# Patient Record
Sex: FEMALE | Race: WHITE | NOT HISPANIC OR LATINO | Employment: UNEMPLOYED | URBAN - METROPOLITAN AREA
[De-identification: names, ages, dates, MRNs, and addresses within clinical notes are randomized per-mention and may not be internally consistent; named-entity substitution may affect disease eponyms.]

---

## 2018-01-08 ENCOUNTER — GENERIC CONVERSION - ENCOUNTER (OUTPATIENT)
Dept: OTHER | Facility: OTHER | Age: 28
End: 2018-01-08

## 2018-01-12 ENCOUNTER — GENERIC CONVERSION - ENCOUNTER (OUTPATIENT)
Dept: OTHER | Facility: OTHER | Age: 28
End: 2018-01-12

## 2018-01-23 NOTE — MISCELLANEOUS
Message   Date: 08 Jan 2018 7:54 AM EST, Recorded By: Ragini Mccarthy For: Teagan Maurice: MADGA NINOICA, Self   Phone: [phone] (Home)   Reason: Renew Medication   Patient called for Nuva Ring refill  Escript sent  Advised schedule appointment  Active Problems    1  Birth control counseling (V25 09) (Z30 09)   2  Breast self examination education, encounter for (V65 49) (Z71 89)   3  Cervical cancer screening (V76 2) (Z12 4)   4  Encounter for routine gynecological examination with Papanicolaou smear of cervix   (V72 31,V76 2) (Z01 419)   5  Oral contraceptive prescribed (V25 01) (Z30 011)   6  Screening for HPV (human papillomavirus) (V73 81) (Z11 51)    Allergies    1   No Known Drug Allergies    Plan  Oral contraceptive prescribed    · NuvaRing 0 12-0 015 MG/24HR Vaginal Ring; INSERT 1 RING VAGINALLY FOR  3 WEEKS THEN 1 WEEK OFF    Signatures   Electronically signed by : Gildardo Partida, ; Jan 8 2018  7:57AM EST                       (Author)

## 2018-02-26 NOTE — MISCELLANEOUS
Message   Recorded as Task   Date: 01/05/2018 09:58 PM, Created By: Shay Prince   Task Name: Care Coordination   Assigned To: Bean Flynn   Regarding Patient: Shanae Salas, Status: In Progress   Comment:    Shay Prince - 05 Jan 2018 9:58 PM     TASK CREATED  pt called the answering service requesting a refill for NUVA RING  I advised pt to call office on Monday AM   DawnPierre - 08 Jan 2018 2:31 PM     TASK REPLIED TO: Previously Assigned To Bean Flynn                      Prescription signed, needs yearly exam soon   Sameera Sheridan - 09 Jan 2018 7:46 AM     TASK IN PROGRESS   Sameera Sheridan - 12 Jan 2018 7:24 AM     TASK EDITED  sent pt a letter reminding her to schedule her yearly exam  pt did not return messages           Active Problems    1  Birth control counseling (V25 09) (Z30 09)   2  Breast self examination education, encounter for (V65 49) (Z71 89)   3  Cervical cancer screening (V76 2) (Z12 4)   4  Encounter for routine gynecological examination with Papanicolaou smear of cervix   (V72 31,V76 2) (Z01 419)   5  Oral contraceptive prescribed (V25 01) (Z30 011)   6  Screening for HPV (human papillomavirus) (V73 81) (Z11 51)    Current Meds   1  NuvaRing 0 12-0 015 MG/24HR Vaginal Ring; INSERT 1 RING VAGINALLY FOR 3   WEEKS THEN 1 WEEK OFF; Therapy: [id number] to ([facility] [place])  Requested for: [id number]; Last   Rx:08Jan2018 Ordered    Allergies    1   No Known Drug Allergies    Signatures   Electronically signed by : Reanna Estrada, ; Jan 12 2018  7:24AM EST                       (Author)

## 2021-04-27 ENCOUNTER — TELEPHONE (OUTPATIENT)
Dept: INTERNAL MEDICINE CLINIC | Facility: CLINIC | Age: 31
End: 2021-04-27

## 2021-04-27 NOTE — TELEPHONE ENCOUNTER
I called patient and no voice mail and when she calls back please ask for her insurance information   Thank you

## 2021-04-30 ENCOUNTER — OFFICE VISIT (OUTPATIENT)
Dept: INTERNAL MEDICINE CLINIC | Facility: CLINIC | Age: 31
End: 2021-04-30
Payer: COMMERCIAL

## 2021-04-30 VITALS
SYSTOLIC BLOOD PRESSURE: 114 MMHG | BODY MASS INDEX: 20.18 KG/M2 | WEIGHT: 118.2 LBS | HEIGHT: 64 IN | HEART RATE: 82 BPM | TEMPERATURE: 96.7 F | OXYGEN SATURATION: 98 % | DIASTOLIC BLOOD PRESSURE: 64 MMHG

## 2021-04-30 DIAGNOSIS — D64.9 ANEMIA, UNSPECIFIED TYPE: Primary | ICD-10-CM

## 2021-04-30 DIAGNOSIS — R63.8 DIFFICULTY MAINTAINING WEIGHT: ICD-10-CM

## 2021-04-30 DIAGNOSIS — J30.2 SEASONAL ALLERGIES: ICD-10-CM

## 2021-04-30 DIAGNOSIS — J30.9 ALLERGIC SINUSITIS: ICD-10-CM

## 2021-04-30 DIAGNOSIS — I73.00 RAYNAUD'S DISEASE WITHOUT GANGRENE: ICD-10-CM

## 2021-04-30 DIAGNOSIS — J45.990 EXERCISE-INDUCED ASTHMA: ICD-10-CM

## 2021-04-30 PROBLEM — F90.2 ATTENTION DEFICIT HYPERACTIVITY DISORDER (ADHD), COMBINED TYPE: Status: ACTIVE | Noted: 2021-04-30

## 2021-04-30 PROBLEM — J30.1 SEASONAL ALLERGIC RHINITIS DUE TO POLLEN: Status: ACTIVE | Noted: 2021-04-30

## 2021-04-30 PROCEDURE — 3008F BODY MASS INDEX DOCD: CPT | Performed by: INTERNAL MEDICINE

## 2021-04-30 PROCEDURE — 1036F TOBACCO NON-USER: CPT | Performed by: INTERNAL MEDICINE

## 2021-04-30 PROCEDURE — 3725F SCREEN DEPRESSION PERFORMED: CPT | Performed by: INTERNAL MEDICINE

## 2021-04-30 PROCEDURE — 99203 OFFICE O/P NEW LOW 30 MIN: CPT | Performed by: INTERNAL MEDICINE

## 2021-04-30 RX ORDER — ALBUTEROL SULFATE 90 UG/1
2 AEROSOL, METERED RESPIRATORY (INHALATION) EVERY 6 HOURS PRN
Qty: 8.5 G | Refills: 1 | Status: SHIPPED | OUTPATIENT
Start: 2021-04-30 | End: 2021-09-22

## 2021-04-30 RX ORDER — ETONOGESTREL AND ETHINYL ESTRADIOL 11.7; 2.7 MG/1; MG/1
1 INSERT, EXTENDED RELEASE VAGINAL
COMMUNITY

## 2021-04-30 RX ORDER — DEXTROAMPHETAMINE SACCHARATE, AMPHETAMINE ASPARTATE MONOHYDRATE, DEXTROAMPHETAMINE SULFATE AND AMPHETAMINE SULFATE 5; 5; 5; 5 MG/1; MG/1; MG/1; MG/1
20 CAPSULE, EXTENDED RELEASE ORAL EVERY MORNING
COMMUNITY

## 2021-04-30 NOTE — PROGRESS NOTES
Assessment/Plan:    1  Anemia, unspecified type  CBC and differential    C-reactive protein    Comprehensive metabolic panel    Thyroid Antibodies Panel    T4, free    T3    Tissue Transglutaminase, IgG,IgA    Vitamin B12   2  Difficulty maintaining weight     3  Raynaud's disease without gangrene  GEORGE Screen w/ Reflex to Titer/Pattern    albuterol (PROVENTIL HFA,VENTOLIN HFA) 90 mcg/act inhaler   4  Exercise-induced asthma  [facility] Allergy Panel, Adult   5  Seasonal allergies  [facility] Allergy Panel, Adult   6  Allergic sinusitis              Subjective:      Patient ID: Wells Sandifer is a 35 y o  female who is here today to discuss several issues of a chronic nature and to establish care  Markie Maradiaga and her  live in [place] and soon will be moving to [facility]  They are visiting the area as both just go and her fiance are from this area and staying with ANGELINEdevondeanasavita 77 future in-laws  Just has a lifelong history of difficulty with maintaining weight despite a good appetite, with chronic low-grade dyspepsia after eating, sometimes some gassiness, no diarrhea but occasional constipation  Associated with his history is chronic anemia, with iron deficiency, including onset prior to menarche when she was about [age]  Generally she is prescribed iron, which worsened her constipation, and her anemia persists in iron levels never reach normal       Also, just good notes that if he exercises strenuously, she times feels weak and lightheaded  There is no chest pain, palpitations, syncope  This is more feeling of running out of energy  Also, there is a history of both seasonal allergic nasal congestion and perennial sinus allergic congestion, partially relieved by Clarinex, but always present and not responsive to other antihistamines  There is sinus pressure, itchy eyes, nasal congestion of clear mucus and postnasal drainage        In addition, over the last year to, she has developed wheezing with exercise, and mild dyspnea, no wheezing or dyspnea at rest   There is no previous history of asthma  Symptoms from [facility] worse when she returns home this area and her symptoms originally began when she and her high school in this area  There is a history of Raynaud's disease, and a family history of autoimmune thyroid disease  There is no family history of inflammatory bowel disease, no known history of celiac disease, pernicious anemia  There is no history of rash, no gyn problems, no urinary difficulties  There is no history of any joint inflammation  Regarding chronic iron deficiency anemia, difficulties maintaining weight and nonspecific dyspepsia, these may be related to malabsorptive syndrome including celiac disease, pernicious anemia, with pancreatic insufficiency very unlikely, motility disorder possibility and inflammatory bowel disease  Plan initially is to check appropriate lab work, and proceed with workup as indicated  Regarding allergies and recent onset of adult asthma, manifested as exercise-induced asthma, the plan will be allergy panel, continue Clarinex for now, start albuterol inhaler 2 relations 20 minutes before exercise  I will contact just go with her lab results and then assess response to albuterol  We discussed follow-up with a new primary care physician when she moved to [facility] with an allergist, as parties when she moves to [place] in July  The meantime I will stay involved with her care            Past Medical History:   Diagnosis Date    ADHD     Anemia         Family History   Problem Relation Age of Onset    No Known Problems Mother     Hypertension Father         Social History     Socioeconomic History    Marital status: Single     Spouse name: Not on file    Number of children: Not on file    Years of education: Not on file    Highest education level: Not on file Occupational History    Not on file   Social Needs    Financial resource strain: Not on file    Food insecurity     Worry: Not on file     Inability: Not on file    Transportation needs     Medical: Not on file     Non-medical: Not on file   Tobacco Use    Smoking status: Never Smoker    Smokeless tobacco: Never Used   Substance and Sexual Activity    Alcohol use: Yes     Frequency: Monthly or less     Drinks per session: 3 or 4     Binge frequency: Less than monthly     Comment: social    Drug use: Never    Sexual activity: Not on file   Lifestyle    Physical activity     Days per week: Not on file     Minutes per session: Not on file    Stress: Not on file   Relationships    Social connections     Talks on phone: Not on file     Gets together: Not on file     Attends Yazidi service: Not on file     Active member of club or organization: Not on file     Attends meetings of clubs or organizations: Not on file     Relationship status: Not on file    Intimate partner violence     Fear of current or ex partner: Not on file     Emotionally abused: Not on file     Physically abused: Not on file     Forced sexual activity: Not on file   Other Topics Concern    Not on file   Social History Narrative    Not on file        No Known Allergies     Current Outpatient Medications   Medication Sig Dispense Refill    amphetamine-dextroamphetamine (ADDERALL XR) 20 MG 24 hr capsule Take 20 mg by mouth every morning      etonogestrel-ethinyl estradiol (NUVARING) 0 12-0 015 MG/24HR vaginal ring Insert 1 each into the vagina every 28 days Insert vaginally and leave in place for 3 consecutive weeks, then remove for 1 week   albuterol (PROVENTIL HFA,VENTOLIN HFA) 90 mcg/act inhaler Inhale 2 puffs every 6 (six) hours as needed for wheezing or shortness of breath 8 5 g 1     No current facility-administered medications for this visit  Review of Systems  as above      Objective:      /64   Pulse 82 Temp (!) 96 7 °F (35 9 °C) (Tympanic)   Ht 5' 4" (1 626 m)   Wt 53 6 kg (118 lb 3 2 oz)   SpO2 98%   BMI 20 29 kg/m²      Wt Readings from Last 3 Encounters:   04/30/21 53 6 kg (118 lb 3 2 oz)   12/30/16 58 5 kg (129 lb)   12/30/15 61 2 kg (135 lb)     Temp Readings from Last 3 Encounters:   04/30/21 (!) 96 7 °F (35 9 °C) (Tympanic)     BP Readings from Last 3 Encounters:   04/30/21 114/64   12/30/16 118/64   12/30/15 120/70     Pulse Readings from Last 3 Encounters:   04/30/21 82       VSS, afebrile, pulse regular    Alert and Oriented in NAD    HEENT: NCAT, Pupils equal, 3 mm, EOEMI, no icterus  There is obvious conjunctival pallor, no iritis  There is mild allergic conjunctivitis  No head or neck mass or adenopathy  There is mild fullness over the facial sinuses  Neck: supple, no trauma or tenderness  No JVD  Trachea midline without stridor  Thyroid exam normal on inspection and palpation  No spinal tenderness, full range of motion  Lymphatics: no adenopathy throughout    Chest:  No trauma or deformity, no supraclavicular adenopathy or bruit  Chest wall expansion is symmetric and normal, expiratory phase is not prolonged  Heart:  Regular rate and rhythm, normal V0-R9, no J7-M5, no clicks murmurs or rubs  Lungs:  Clear to auscultation and normal to percussion  Back:  No trauma or deformity, no CVA mass or CVA tenderness  Skin:  No rash or skin malignancy  Abdomen:  Nondistended, normal bowel sounds, soft nontender without masses bruits organomegaly  There is no hernia  There are no surgical scars  Extremities:  Arterial circulation is symmetrically normal with no clubbing cyanosis or edema  There are no varicosities, there is no calf tenderness or phlebitic findings  Joints:  No deformity, swelling, redness, tenderness or increased temperature, no instability  There are no tophi      Affect:  Normal    Neurologic:  Normal and stable gait, and otherwise no focal findings as well  Cognitive: Intact

## 2021-05-02 ENCOUNTER — APPOINTMENT (OUTPATIENT)
Dept: LAB | Facility: HOSPITAL | Age: 31
End: 2021-05-02
Payer: COMMERCIAL

## 2021-05-02 DIAGNOSIS — J30.2 SEASONAL ALLERGIES: ICD-10-CM

## 2021-05-02 DIAGNOSIS — J45.990 EXERCISE-INDUCED ASTHMA: ICD-10-CM

## 2021-05-02 LAB
ALBUMIN SERPL BCP-MCNC: 3.7 G/DL (ref 3.5–5)
ALP SERPL-CCNC: 57 U/L (ref 46–116)
ALT SERPL W P-5'-P-CCNC: 31 U/L (ref 12–78)
ANION GAP SERPL CALCULATED.3IONS-SCNC: 10 MMOL/L (ref 4–13)
ANISOCYTOSIS BLD QL SMEAR: PRESENT
AST SERPL W P-5'-P-CCNC: 24 U/L (ref 5–45)
BASOPHILS # BLD MANUAL: 0.05 THOUSAND/UL (ref 0–0.1)
BASOPHILS NFR MAR MANUAL: 2 % (ref 0–1)
BILIRUB SERPL-MCNC: 0.15 MG/DL (ref 0.2–1)
BUN SERPL-MCNC: 8 MG/DL (ref 5–25)
CALCIUM SERPL-MCNC: 9 MG/DL (ref 8.3–10.1)
CHLORIDE SERPL-SCNC: 104 MMOL/L (ref 100–108)
CO2 SERPL-SCNC: 26 MMOL/L (ref 21–32)
CREAT SERPL-MCNC: 0.74 MG/DL (ref 0.6–1.3)
CRP SERPL QL: 22 MG/L
EOSINOPHIL # BLD MANUAL: 0.05 THOUSAND/UL (ref 0–0.4)
EOSINOPHIL NFR BLD MANUAL: 2 % (ref 0–6)
ERYTHROCYTE [DISTWIDTH] IN BLOOD BY AUTOMATED COUNT: 15.3 % (ref 11.6–15.1)
GFR SERPL CREATININE-BSD FRML MDRD: 110 ML/MIN/1.73SQ M
GLUCOSE P FAST SERPL-MCNC: 89 MG/DL (ref 65–99)
HCT VFR BLD AUTO: 30.7 % (ref 34.8–46.1)
HGB BLD-MCNC: 8.1 G/DL (ref 11.5–15.4)
HYPERCHROMIA BLD QL SMEAR: PRESENT
LG PLATELETS BLD QL SMEAR: PRESENT
LYMPHOCYTES # BLD AUTO: 1.08 THOUSAND/UL (ref 0.6–4.47)
LYMPHOCYTES # BLD AUTO: 42 % (ref 14–44)
MCH RBC QN AUTO: 18.5 PG (ref 26.8–34.3)
MCHC RBC AUTO-ENTMCNC: 26.4 G/DL (ref 31.4–37.4)
MCV RBC AUTO: 70 FL (ref 82–98)
MONOCYTES # BLD AUTO: 0.39 THOUSAND/UL (ref 0–1.22)
MONOCYTES NFR BLD: 15 % (ref 4–12)
NEUTROPHILS # BLD MANUAL: 0.93 THOUSAND/UL (ref 1.85–7.62)
NEUTS SEG NFR BLD AUTO: 36 % (ref 43–75)
NRBC BLD AUTO-RTO: 0 /100 WBCS
OVALOCYTES BLD QL SMEAR: PRESENT
PLATELET # BLD AUTO: 388 THOUSANDS/UL (ref 149–390)
PLATELET BLD QL SMEAR: ADEQUATE
PMV BLD AUTO: 8.8 FL (ref 8.9–12.7)
POTASSIUM SERPL-SCNC: 3.7 MMOL/L (ref 3.5–5.3)
PROT SERPL-MCNC: 7.3 G/DL (ref 6.4–8.2)
RBC # BLD AUTO: 4.38 MILLION/UL (ref 3.81–5.12)
SODIUM SERPL-SCNC: 140 MMOL/L (ref 136–145)
T3 SERPL-MCNC: 1.2 NG/ML (ref 0.6–1.8)
T4 FREE SERPL-MCNC: 1.03 NG/DL (ref 0.76–1.46)
TARGETS BLD QL SMEAR: PRESENT
TOTAL CELLS COUNTED SPEC: 100
VARIANT LYMPHS # BLD AUTO: 3 %
VIT B12 SERPL-MCNC: 227 PG/ML (ref 100–900)
WBC # BLD AUTO: 2.57 THOUSAND/UL (ref 4.31–10.16)

## 2021-05-02 PROCEDURE — 86039 ANTINUCLEAR ANTIBODIES (ANA): CPT | Performed by: INTERNAL MEDICINE

## 2021-05-02 PROCEDURE — 83516 IMMUNOASSAY NONANTIBODY: CPT | Performed by: INTERNAL MEDICINE

## 2021-05-02 PROCEDURE — 82785 ASSAY OF IGE: CPT

## 2021-05-02 PROCEDURE — 82607 VITAMIN B-12: CPT | Performed by: INTERNAL MEDICINE

## 2021-05-02 PROCEDURE — 36415 COLL VENOUS BLD VENIPUNCTURE: CPT | Performed by: INTERNAL MEDICINE

## 2021-05-02 PROCEDURE — 86038 ANTINUCLEAR ANTIBODIES: CPT | Performed by: INTERNAL MEDICINE

## 2021-05-02 PROCEDURE — 84480 ASSAY TRIIODOTHYRONINE (T3): CPT | Performed by: INTERNAL MEDICINE

## 2021-05-02 PROCEDURE — 86003 ALLG SPEC IGE CRUDE XTRC EA: CPT

## 2021-05-02 PROCEDURE — 86140 C-REACTIVE PROTEIN: CPT | Performed by: INTERNAL MEDICINE

## 2021-05-02 PROCEDURE — 84439 ASSAY OF FREE THYROXINE: CPT | Performed by: INTERNAL MEDICINE

## 2021-05-02 PROCEDURE — 85027 COMPLETE CBC AUTOMATED: CPT | Performed by: INTERNAL MEDICINE

## 2021-05-02 PROCEDURE — 80053 COMPREHEN METABOLIC PANEL: CPT | Performed by: INTERNAL MEDICINE

## 2021-05-02 PROCEDURE — 85007 BL SMEAR W/DIFF WBC COUNT: CPT | Performed by: INTERNAL MEDICINE

## 2021-05-03 DIAGNOSIS — D50.9 MICROCYTIC ANEMIA: Primary | ICD-10-CM

## 2021-05-03 DIAGNOSIS — E53.8 B12 DEFICIENCY: Primary | ICD-10-CM

## 2021-05-03 DIAGNOSIS — R53.83 FATIGUE, UNSPECIFIED TYPE: ICD-10-CM

## 2021-05-03 LAB
A ALTERNATA IGE QN: <0.1 KUA/I
A FUMIGATUS IGE QN: <0.1 KUA/I
ALLERGEN COMMENT: ABNORMAL
ANA HOMOGEN SER QL IF: NORMAL
ANA HOMOGEN TITR SER: NORMAL {TITER}
BERMUDA GRASS IGE QN: <0.1 KUA/I
BOXELDER IGE QN: <0.1 KUA/I
C HERBARUM IGE QN: <0.1 KUA/I
CAT DANDER IGE QN: <0.1 KUA/I
CMN PIGWEED IGE QN: <0.1 KUA/I
COMMON RAGWEED IGE QN: <0.1 KUA/I
COTTONWOOD IGE QN: <0.1 KUA/I
D FARINAE IGE QN: <0.1 KUA/I
D PTERONYSS IGE QN: <0.1 KUA/I
DOG DANDER IGE QN: <0.1 KUA/I
LONDON PLANE IGE QN: <0.1 KUA/I
MOUSE URINE PROT IGE QN: <0.1 KUA/I
MT JUNIPER IGE QN: <0.1 KUA/I
MUGWORT IGE QN: <0.1 KUA/I
P NOTATUM IGE QN: <0.1 KUA/I
ROACH IGE QN: <0.1 KUA/I
RYE IGE QN: POSITIVE
SHEEP SORREL IGE QN: <0.1 KUA/I
SILVER BIRCH IGE QN: <0.1 KUA/I
TIMOTHY IGE QN: <0.1 KUA/I
TOTAL IGE SMQN RAST: 20.6 KU/L (ref 0–113)
TTG IGA SER-ACNC: <2 U/ML (ref 0–3)
TTG IGG SER-ACNC: <2 U/ML (ref 0–5)
WALNUT IGE QN: <0.1 KUA/I
WHITE ASH IGE QN: 0.13 KUA/I
WHITE ELM IGE QN: <0.1 KUA/I
WHITE MULBERRY IGE QN: <0.1 KUA/I
WHITE OAK IGE QN: <0.1 KUA/I

## 2021-05-04 ENCOUNTER — TELEPHONE (OUTPATIENT)
Dept: INTERNAL MEDICINE CLINIC | Facility: CLINIC | Age: 31
End: 2021-05-04

## 2021-05-04 DIAGNOSIS — J30.1 SEASONAL ALLERGIC RHINITIS DUE TO POLLEN: Primary | ICD-10-CM

## 2021-05-04 RX ORDER — AZELASTINE 1 MG/ML
1 SPRAY, METERED NASAL 2 TIMES DAILY
Qty: 1 BOTTLE | Refills: 1 | Status: SHIPPED | OUTPATIENT
Start: 2021-05-04 | End: 2021-05-28

## 2021-05-04 NOTE — PROGRESS NOTES
I spoke to Bigg Rodas and reviewed the results  In particular, we discussed that she has severe chronic anemia, with evidence of iron deficiency and B12 deficiency which would indicate chronic malabsorption of these nutrients  I asked Bigg Rodas to have follow-up lab work, including TSH, iron, ferritin and methylmalonic acid levels drawn tomorrow morning  We discussed the likelihood of starting Venofer infusion and B12 injections  We discussed that her low energy level should improve significantly with treatment  We discussed the negative TTG antibody test, elevated CRP a mildly positive GEORGE  I think the GEORGE is a collateral of chronic inflammation and probably increased cell turnover, and that the CRP indicates a chronic inflammatory condition which could be related to celiac disease even know antibody testing is negative  We discussed an alternative diagnosis of possible inflammatory bowel disease, including Crohn's disease  We discussed the need for GI follow-up once the rest of the testing is back, and I will consult Gastroenterology, and order a CT of the abdomen and pelvis with GI imaging at the time of GI consult  We reviewed her [facility] Allergy Panel result which showed sensitivity to white curtis trees but no other sensitivities  Her symptoms tend to be in the spring and early summer  Plan is a trial of Astelin nasal spray 1 spray in each nostril twice daily, and then gauge response

## 2021-05-04 NOTE — PROGRESS NOTES
Yesterday morning, I attempted to contact just go and left a voicemail on her phone to return my call  I just left a 2nd voicemail asking the just got return my call as well  Just because labs show severe chronic anemia, mild leukopenia, with hemoglobin of 8 1, hematocrit 30 7, white count 2 57, low MCV of 70, abnormal differential white count including decreased neutrophils, slight increased monocytes, increased basophils and atypical lymphocytes  B12 level is low at 227  Methylmalonic acid level was ordered and is pending as just goal will need to return to the lab  Iron studies were ordered and are pending and just go will need to return to lab for these  CRP is elevated 22 0  GEORGE is positive  TTG antibody test is negative for celiac disease  T4 and T3 are normal with TSH pending  Thyroid antibody screen is pending  Plan:  Continue diagnostic workup as this appears to be negative for celiac disease  Possibilities would be inflammatory bowel disease including Crohn's disease causing chronic malabsorption  Other possibilities could be psychogenic cause  This would be a diagnosis of exclusion  Will discuss CT of the abdomen and pelvis with oral contrast, and follow-up with Gastroenterology  Also, given severity of anemia and probable severe iron deficiency, iron infusion therapy will be recommended as well starting intramuscular vitamin B12  I will continue to attempt to reach Jaz Alfaro by phone  Paty Grijalva

## 2021-05-05 ENCOUNTER — APPOINTMENT (OUTPATIENT)
Dept: LAB | Facility: MEDICAL CENTER | Age: 31
End: 2021-05-05
Payer: COMMERCIAL

## 2021-05-05 DIAGNOSIS — E53.8 B12 DEFICIENCY: ICD-10-CM

## 2021-05-05 DIAGNOSIS — D50.9 MICROCYTIC ANEMIA: ICD-10-CM

## 2021-05-05 LAB
FERRITIN SERPL-MCNC: 3 NG/ML (ref 8–388)
IRON SATN MFR SERPL: 3 %
IRON SERPL-MCNC: 18 UG/DL (ref 50–170)
TIBC SERPL-MCNC: 616 UG/DL (ref 250–450)
TSH SERPL DL<=0.05 MIU/L-ACNC: 1.64 UIU/ML (ref 0.36–3.74)

## 2021-05-05 PROCEDURE — 82728 ASSAY OF FERRITIN: CPT | Performed by: INTERNAL MEDICINE

## 2021-05-05 PROCEDURE — 83918 ORGANIC ACIDS TOTAL QUANT: CPT

## 2021-05-05 PROCEDURE — 86376 MICROSOMAL ANTIBODY EACH: CPT | Performed by: INTERNAL MEDICINE

## 2021-05-05 PROCEDURE — 36415 COLL VENOUS BLD VENIPUNCTURE: CPT | Performed by: INTERNAL MEDICINE

## 2021-05-05 PROCEDURE — 86800 THYROGLOBULIN ANTIBODY: CPT | Performed by: INTERNAL MEDICINE

## 2021-05-05 PROCEDURE — 84443 ASSAY THYROID STIM HORMONE: CPT | Performed by: INTERNAL MEDICINE

## 2021-05-05 PROCEDURE — 83550 IRON BINDING TEST: CPT

## 2021-05-05 PROCEDURE — 83540 ASSAY OF IRON: CPT

## 2021-05-06 ENCOUNTER — TELEPHONE (OUTPATIENT)
Dept: INTERNAL MEDICINE CLINIC | Facility: CLINIC | Age: 31
End: 2021-05-06

## 2021-05-06 DIAGNOSIS — D50.8 OTHER IRON DEFICIENCY ANEMIA: Primary | ICD-10-CM

## 2021-05-06 DIAGNOSIS — K90.9 IRON MALABSORPTION: ICD-10-CM

## 2021-05-06 DIAGNOSIS — E53.8 B12 DEFICIENCY: ICD-10-CM

## 2021-05-06 LAB
THYROGLOB AB SERPL-ACNC: <1 IU/ML (ref 0–0.9)
THYROPEROXIDASE AB SERPL-ACNC: <9 IU/ML (ref 0–34)

## 2021-05-06 RX ORDER — SODIUM CHLORIDE 9 MG/ML
20 INJECTION, SOLUTION INTRAVENOUS ONCE
Status: CANCELLED | OUTPATIENT
Start: 2021-05-13

## 2021-05-06 NOTE — PROGRESS NOTES
Addendum to chart:  The latest test results in follow-up to recent testing showing microcytic anemia with MCV of 70, hemoglobin 8 1, hematocrit 30 7, included an iron level which was low at 18, of low ferritin level 3, elevated TIBC of 616 and iron saturation of 3  This is consistent with severe iron deficiency anemia in the context of intestinal malabsorption of unknown cause  Also, B12 level is low at 227 with methylmalonic acid level pending  TTG IgA IgM antibodies are negative  Plan:  Continue workup for intestinal malabsorption induced chronic iron deficiency anemia and B12 deficiency  This will include a CT of the abdomen and pelvis with oral contrast, and referral to [address] [place] [address] gastroenterology  Treatment will include iron infusion with Venofer and the infusion protocol was completed  Also, will start B12 injections, 1000 mcg I am this week, then in 2 weeks, then in 4 weeks, and then monthly thereafter on indefinite basis  I did leave a message for just ago so that I can as outlined they plan to her  We will help arrange for iron infusion scheduling at the [facility] and B12 injections at our office  Will recheck CBC and iron levels following iron infusion  Despite negative antibody testing, celiac disease would still be a possibility  An alternative would be upper intestinal involvement from inflammatory bowel disease such as Crohn's disease  Nutritional deficiency is a possibility

## 2021-05-07 ENCOUNTER — TELEPHONE (OUTPATIENT)
Dept: INTERNAL MEDICINE CLINIC | Facility: CLINIC | Age: 31
End: 2021-05-07

## 2021-05-07 NOTE — TELEPHONE ENCOUNTER
Left voicemail for pt to call office to schedule B-12 injection and get availability to schedule iron infusion and CT abdomen/pevlis w/contrast  Will try again this afternoon

## 2021-05-07 NOTE — TELEPHONE ENCOUNTER
Scheduled pt for her CT scan on 5/14/21 at [facility]  Pt will be picking up the barium prior to test date  Gave pt detailed instructions for CT prep  Scheduled pt for B-12 shot in office  Scheduled pt for iron infusion on 5/13/21, 5/22/21 and 5/29/21 at [facility]  Pt informed on all dates, times and locations

## 2021-05-10 DIAGNOSIS — K90.9 IRON MALABSORPTION: Primary | ICD-10-CM

## 2021-05-10 DIAGNOSIS — D50.8 OTHER IRON DEFICIENCY ANEMIA: ICD-10-CM

## 2021-05-11 ENCOUNTER — CLINICAL SUPPORT (OUTPATIENT)
Dept: INTERNAL MEDICINE CLINIC | Facility: CLINIC | Age: 31
End: 2021-05-11

## 2021-05-11 DIAGNOSIS — E53.8 B12 DEFICIENCY: Primary | ICD-10-CM

## 2021-05-12 LAB
METHYLMALONATE SERPL-SCNC: 146 NMOL/L (ref 0–378)
SL AMB DISCLAIMER: NORMAL

## 2021-05-13 ENCOUNTER — HOSPITAL ENCOUNTER (OUTPATIENT)
Dept: INFUSION CENTER | Facility: CLINIC | Age: 31
Discharge: HOME | End: 2021-05-13
Payer: COMMERCIAL

## 2021-05-13 VITALS
DIASTOLIC BLOOD PRESSURE: 68 MMHG | TEMPERATURE: 97.7 F | OXYGEN SATURATION: 98 % | HEART RATE: 110 BPM | SYSTOLIC BLOOD PRESSURE: 112 MMHG | RESPIRATION RATE: 14 BRPM

## 2021-05-13 DIAGNOSIS — D50.8 OTHER IRON DEFICIENCY ANEMIA: ICD-10-CM

## 2021-05-13 DIAGNOSIS — K90.9 IRON MALABSORPTION: Primary | ICD-10-CM

## 2021-05-13 PROCEDURE — 96365 THER/PROPH/DIAG IV INF INIT: CPT

## 2021-05-13 RX ORDER — SODIUM CHLORIDE 9 MG/ML
20 INJECTION, SOLUTION INTRAVENOUS ONCE
Status: CANCELLED | OUTPATIENT
Start: 2021-05-20

## 2021-05-13 RX ORDER — SODIUM CHLORIDE 9 MG/ML
20 INJECTION, SOLUTION INTRAVENOUS ONCE
Status: COMPLETED | OUTPATIENT
Start: 2021-05-13 | End: 2021-05-13

## 2021-05-13 RX ADMIN — SODIUM CHLORIDE 20 ML/HR: 0.9 INJECTION, SOLUTION INTRAVENOUS at 10:52

## 2021-05-13 RX ADMIN — IRON SUCROSE 300 MG: 20 INJECTION, SOLUTION INTRAVENOUS at 10:57

## 2021-05-14 ENCOUNTER — TELEPHONE (OUTPATIENT)
Dept: INTERNAL MEDICINE CLINIC | Facility: CLINIC | Age: 31
End: 2021-05-14

## 2021-05-14 NOTE — TELEPHONE ENCOUNTER
Pts CT was denied by her insurance company,so she cancelled the test I told her we would get back to her with further instructions with how you would like to proceed  Karena More can be reached at [phone]

## 2021-05-17 ENCOUNTER — TELEPHONE (OUTPATIENT)
Dept: INTERNAL MEDICINE CLINIC | Facility: CLINIC | Age: 31
End: 2021-05-17

## 2021-05-17 DIAGNOSIS — D50.8 OTHER IRON DEFICIENCY ANEMIA: Primary | ICD-10-CM

## 2021-05-17 DIAGNOSIS — E53.8 B12 DEFICIENCY: ICD-10-CM

## 2021-05-17 DIAGNOSIS — K90.9 IRON MALABSORPTION: ICD-10-CM

## 2021-05-17 NOTE — PROGRESS NOTES
I spoke to just go who be undergoing Venofer iron infusion #2 on May 22nd and B12 injection #2 on May 24  Nandini's insurance denied the CT of the abdomen despite this diagnostic test being medically necessary  We discussed that her chronic severe iron deficiency, and what seems to be relatively recent B12 deficiency, both of which are related to have chronic malabsorption, requires a diagnostic workup  As her initial labs did not show evidence of celiac disease, diagnostic workup needs to include a CT of the abdomen and pelvis to look for inflammatory bowel disease, anatomic evidence for bacterial overgrowth, and also a diagnostic workup is needed through Gastroenterology including probable endoscopy and diagnostic testing  For this reason, [facility]'s gastroenterology asking for a consult in the near future, regarding chronic iron malabsorption and recent B12 malabsorption  I will contact just go with for follow-up lab work ordered today, including a repeat CBC, iron studies and B12 levels, and remain available as the diagnostic process transpires  Adjusted continues to have exercise intolerance, getting out of breath and fatigue rather easily and the occasional chronic migraine she has may also be related to anemia as well we discussed this

## 2021-05-18 ENCOUNTER — LAB (OUTPATIENT)
Dept: LAB | Facility: HOSPITAL | Age: 31
End: 2021-05-18
Payer: COMMERCIAL

## 2021-05-18 LAB
BASOPHILS # BLD AUTO: 0.01 THOUSANDS/ΜL (ref 0–0.1)
BASOPHILS NFR BLD AUTO: 0 % (ref 0–1)
EOSINOPHIL # BLD AUTO: 0.08 THOUSAND/ΜL (ref 0–0.61)
EOSINOPHIL NFR BLD AUTO: 2 % (ref 0–6)
ERYTHROCYTE [DISTWIDTH] IN BLOOD BY AUTOMATED COUNT: 19.1 % (ref 11.6–15.1)
FERRITIN SERPL-MCNC: 65 NG/ML (ref 8–388)
HCT VFR BLD AUTO: 31.9 % (ref 34.8–46.1)
HGB BLD-MCNC: 8.5 G/DL (ref 11.5–15.4)
IMM GRANULOCYTES # BLD AUTO: 0 THOUSAND/UL (ref 0–0.2)
IMM GRANULOCYTES NFR BLD AUTO: 0 % (ref 0–2)
IRON SERPL-MCNC: 20 UG/DL (ref 50–170)
LYMPHOCYTES # BLD AUTO: 2 THOUSANDS/ΜL (ref 0.6–4.47)
LYMPHOCYTES NFR BLD AUTO: 47 % (ref 14–44)
MCH RBC QN AUTO: 19.1 PG (ref 26.8–34.3)
MCHC RBC AUTO-ENTMCNC: 26.6 G/DL (ref 31.4–37.4)
MCV RBC AUTO: 72 FL (ref 82–98)
MONOCYTES # BLD AUTO: 0.38 THOUSAND/ΜL (ref 0.17–1.22)
MONOCYTES NFR BLD AUTO: 9 % (ref 4–12)
NEUTROPHILS # BLD AUTO: 1.8 THOUSANDS/ΜL (ref 1.85–7.62)
NEUTS SEG NFR BLD AUTO: 42 % (ref 43–75)
NRBC BLD AUTO-RTO: 0 /100 WBCS
PLATELET # BLD AUTO: 473 THOUSANDS/UL (ref 149–390)
PMV BLD AUTO: 8.4 FL (ref 8.9–12.7)
RBC # BLD AUTO: 4.44 MILLION/UL (ref 3.81–5.12)
VIT B12 SERPL-MCNC: 419 PG/ML (ref 100–900)
WBC # BLD AUTO: 4.27 THOUSAND/UL (ref 4.31–10.16)

## 2021-05-18 PROCEDURE — 36415 COLL VENOUS BLD VENIPUNCTURE: CPT | Performed by: INTERNAL MEDICINE

## 2021-05-18 PROCEDURE — 83540 ASSAY OF IRON: CPT | Performed by: INTERNAL MEDICINE

## 2021-05-18 PROCEDURE — 85025 COMPLETE CBC W/AUTO DIFF WBC: CPT | Performed by: INTERNAL MEDICINE

## 2021-05-18 PROCEDURE — 82607 VITAMIN B-12: CPT | Performed by: INTERNAL MEDICINE

## 2021-05-18 PROCEDURE — 82728 ASSAY OF FERRITIN: CPT | Performed by: INTERNAL MEDICINE

## 2021-05-22 ENCOUNTER — HOSPITAL ENCOUNTER (OUTPATIENT)
Dept: INFUSION CENTER | Facility: CLINIC | Age: 31
Discharge: HOME | End: 2021-05-22
Payer: COMMERCIAL

## 2021-05-22 VITALS
RESPIRATION RATE: 18 BRPM | SYSTOLIC BLOOD PRESSURE: 106 MMHG | TEMPERATURE: 98.4 F | DIASTOLIC BLOOD PRESSURE: 72 MMHG | HEART RATE: 100 BPM

## 2021-05-22 DIAGNOSIS — D50.8 OTHER IRON DEFICIENCY ANEMIA: ICD-10-CM

## 2021-05-22 DIAGNOSIS — K90.9 IRON MALABSORPTION: Primary | ICD-10-CM

## 2021-05-22 PROCEDURE — 96365 THER/PROPH/DIAG IV INF INIT: CPT

## 2021-05-22 PROCEDURE — 96366 THER/PROPH/DIAG IV INF ADDON: CPT

## 2021-05-22 RX ORDER — SODIUM CHLORIDE 9 MG/ML
20 INJECTION, SOLUTION INTRAVENOUS ONCE
Status: COMPLETED | OUTPATIENT
Start: 2021-05-22 | End: 2021-05-22

## 2021-05-22 RX ORDER — SODIUM CHLORIDE 9 MG/ML
20 INJECTION, SOLUTION INTRAVENOUS ONCE
Status: CANCELLED | OUTPATIENT
Start: 2021-05-29

## 2021-05-22 RX ADMIN — SODIUM CHLORIDE 20 ML/HR: 0.9 INJECTION, SOLUTION INTRAVENOUS at 13:40

## 2021-05-22 RX ADMIN — IRON SUCROSE 300 MG: 20 INJECTION, SOLUTION INTRAVENOUS at 13:42

## 2021-05-22 NOTE — PLAN OF CARE

## 2021-05-22 NOTE — PLAN OF CARE
Problem: SAFETY ADULT  Goal: Patient will remain free of falls  Description: INTERVENTIONS:  - Assess patient frequently for physical needs  -  Identify cognitive and physical deficits and behaviors that affect risk of falls    -  [place] fall precautions as indicated by assessment   - Educate patient/family on patient safety including physical limitations  - Instruct patient to call for assistance with activity based on assessment  - Modify environment to reduce risk of injury  - Consider OT/PT consult to assist with strengthening/mobility  5/22/2021 1530 by Gale Franco RN  Outcome: Progressing  5/22/2021 1530 by Gale Franco RN  Outcome: Progressing  Goal: Maintain or return to baseline ADL function  Description: INTERVENTIONS:  -  Assess patient's ability to carry out ADLs; assess patient's baseline for ADL function and identify physical deficits which impact ability to perform ADLs (bathing, care of mouth/teeth, toileting, grooming, dressing, etc )  - Assess/evaluate cause of self-care deficits   - Assess range of motion  - Assess patient's mobility; develop plan if impaired  - Assess patient's need for assistive devices and provide as appropriate  - Encourage maximum independence but intervene and supervise when necessary  - Involve family in performance of ADLs  - Assess for home care needs following discharge   - Consider OT consult to assist with ADL evaluation and planning for discharge  - Provide patient education as appropriate  5/22/2021 1530 by Gale Franco RN  Outcome: Progressing  5/22/2021 1530 by Gale Franco RN  Outcome: Progressing  Goal: Maintain or return mobility status to optimal level  Description: INTERVENTIONS:  - Assess patient's baseline mobility status (ambulation, transfers, stairs, etc )    - Identify cognitive and physical deficits and behaviors that affect mobility  - Identify mobility aids required to assist with transfers and/or ambulation (gait belt, sit-to-stand, lift, walker, cane, etc )  - [place] fall precautions as indicated by assessment  - Record patient progress and toleration of activity level on Mobility SBAR; progress patient to next Phase/Stage  - Instruct patient to call for assistance with activity based on assessment  - Consider rehabilitation consult to assist with strengthening/weightbearing, etc   5/22/2021 1530 by José Miguel Bryan, RN  Outcome: Progressing  5/22/2021 1530 by José Miguel Bryan, RN  Outcome: Progressing

## 2021-05-22 NOTE — PROGRESS NOTES
Pt arrived for venofer infusion without complaints    she feels well   VSS, she reported some initial anxiety-- but otherwise ok     pt completed Venofer as ordered and was dcd stable and ambulatory with AVS    end re check on vs- stable    Confirmed appt next Saturday 11 am 5/29/2021

## 2021-05-25 ENCOUNTER — TELEPHONE (OUTPATIENT)
Dept: INTERNAL MEDICINE CLINIC | Facility: CLINIC | Age: 31
End: 2021-05-25

## 2021-05-25 ENCOUNTER — CLINICAL SUPPORT (OUTPATIENT)
Dept: INTERNAL MEDICINE CLINIC | Facility: CLINIC | Age: 31
End: 2021-05-25

## 2021-05-25 DIAGNOSIS — D50.8 OTHER IRON DEFICIENCY ANEMIA: ICD-10-CM

## 2021-05-25 DIAGNOSIS — E53.8 B12 DEFICIENCY: Primary | ICD-10-CM

## 2021-05-25 DIAGNOSIS — K90.9 IRON MALABSORPTION: Primary | ICD-10-CM

## 2021-05-25 DIAGNOSIS — E53.8 B12 DEFICIENCY: ICD-10-CM

## 2021-05-25 NOTE — TELEPHONE ENCOUNTER
Pt had 2 B12 injections so far and with her infusions she wasn't sure if you need her to schedule more B12 injections? I will call her back to inform her how you want her to proceed

## 2021-05-25 NOTE — PROGRESS NOTES
Pt came into the office today and was given a vit B12 injection Rt deltoid without any difficulty or se     NDC# 9182-9965-48 exp  8/22 lot# [id number]

## 2021-05-28 DIAGNOSIS — J30.1 SEASONAL ALLERGIC RHINITIS DUE TO POLLEN: ICD-10-CM

## 2021-05-28 RX ORDER — AZELASTINE 1 MG/ML
SPRAY, METERED NASAL
Qty: 1 ML | Refills: 1 | Status: SHIPPED | OUTPATIENT
Start: 2021-05-28 | End: 2021-06-16

## 2021-05-29 ENCOUNTER — HOSPITAL ENCOUNTER (OUTPATIENT)
Dept: INFUSION CENTER | Facility: CLINIC | Age: 31
Discharge: HOME | End: 2021-05-29
Payer: COMMERCIAL

## 2021-05-29 VITALS
TEMPERATURE: 97.8 F | DIASTOLIC BLOOD PRESSURE: 78 MMHG | RESPIRATION RATE: 14 BRPM | OXYGEN SATURATION: 99 % | HEART RATE: 94 BPM | SYSTOLIC BLOOD PRESSURE: 114 MMHG

## 2021-05-29 DIAGNOSIS — K90.9 IRON MALABSORPTION: Primary | ICD-10-CM

## 2021-05-29 DIAGNOSIS — D50.8 OTHER IRON DEFICIENCY ANEMIA: ICD-10-CM

## 2021-05-29 PROCEDURE — 96365 THER/PROPH/DIAG IV INF INIT: CPT

## 2021-05-29 RX ORDER — SODIUM CHLORIDE 9 MG/ML
20 INJECTION, SOLUTION INTRAVENOUS ONCE
Status: CANCELLED | OUTPATIENT
Start: 2021-06-04

## 2021-05-29 RX ORDER — SODIUM CHLORIDE 9 MG/ML
20 INJECTION, SOLUTION INTRAVENOUS ONCE
Status: COMPLETED | OUTPATIENT
Start: 2021-05-29 | End: 2021-05-29

## 2021-05-29 RX ADMIN — SODIUM CHLORIDE 20 ML/HR: 0.9 INJECTION, SOLUTION INTRAVENOUS at 11:26

## 2021-05-29 RX ADMIN — IRON SUCROSE 300 MG: 20 INJECTION, SOLUTION INTRAVENOUS at 11:26

## 2021-05-29 NOTE — PLAN OF CARE
Problem: Potential for Falls  Goal: Patient will remain free of falls  Description: INTERVENTIONS:  - Assess patient frequently for physical needs  -  Identify cognitive and physical deficits and behaviors that affect risk of falls  -  [place] fall precautions as indicated by assessment   - Educate patient/family on patient safety including physical limitations  - Instruct patient to call for assistance with activity based on assessment  - Modify environment to reduce risk of injury  - Consider OT/PT consult to assist with strengthening/mobility  Outcome: Progressing     Problem: Knowledge Deficit  Goal: Patient/family/caregiver demonstrates understanding of disease process, treatment plan, medications, and discharge instructions  Description: Complete learning assessment and assess knowledge base    Interventions:  - Provide teaching at level of understanding  - Provide teaching via preferred learning methods  Outcome: Progressing Normal Normal Normal Normal

## 2021-06-04 ENCOUNTER — TELEPHONE (OUTPATIENT)
Dept: INFUSION CENTER | Facility: CLINIC | Age: 31
End: 2021-06-04

## 2021-06-04 NOTE — TELEPHONE ENCOUNTER
I reached out to Aidan after she missed her 8am appnt  I left her VM notifying her of her next appnt on 6/11 and requested a return call to add the missed Tx onto the end of her Tx schedule

## 2021-06-09 NOTE — PROGRESS NOTES
Addendum to chart:  I have contacted Meghana Bailey and left messages several times and have not received a call back  I received a message from infusion center that just ago had missed her last infusion appointment which was rescheduled for 2 days from now, to 11/20/2021  I asked our staff to contact just ago regarding coming in next week for follow-up appointment  They did leave a message and I was told by Fabian Nichole that she was unable to speak to adjust the when she called  She did leave a voicemail message which I have done several times  Given her severe iron deficiency, low B12 levels and negative testing for celiac disease, autoimmune gastritis due to H pylori needs to be ruled out  This disorder can cause both vitamin B12 malabsorption and iron malabsorption and could lead to the production of intrinsic factor blocking antibodies and anti parietal antibodies  Meghana Bailey also did not respond to our phone calls for scheduling her 3rd B12 injection  This could be given at time of a follow-up visit next week  Also, repeat CBC, iron studies, B12 and methylmalonic acid are due  Meghana Bailey was also referred to Gastroenterology and there is no record of an appointment  We will continue to attempt to contact just go to assist with treatment and diagnostic follow-up

## 2021-06-11 ENCOUNTER — HOSPITAL ENCOUNTER (OUTPATIENT)
Dept: INFUSION CENTER | Facility: CLINIC | Age: 31
Discharge: HOME | End: 2021-06-11
Payer: COMMERCIAL

## 2021-06-11 VITALS
TEMPERATURE: 97.9 F | DIASTOLIC BLOOD PRESSURE: 85 MMHG | HEART RATE: 96 BPM | OXYGEN SATURATION: 97 % | SYSTOLIC BLOOD PRESSURE: 125 MMHG | RESPIRATION RATE: 18 BRPM

## 2021-06-11 DIAGNOSIS — K90.9 IRON MALABSORPTION: Primary | ICD-10-CM

## 2021-06-11 DIAGNOSIS — D50.8 OTHER IRON DEFICIENCY ANEMIA: ICD-10-CM

## 2021-06-11 PROCEDURE — 96365 THER/PROPH/DIAG IV INF INIT: CPT

## 2021-06-11 RX ORDER — SODIUM CHLORIDE 9 MG/ML
20 INJECTION, SOLUTION INTRAVENOUS ONCE
Status: CANCELLED | OUTPATIENT
Start: 2021-06-19

## 2021-06-11 RX ORDER — SODIUM CHLORIDE 9 MG/ML
20 INJECTION, SOLUTION INTRAVENOUS ONCE
Status: COMPLETED | OUTPATIENT
Start: 2021-06-11 | End: 2021-06-11

## 2021-06-11 RX ADMIN — SODIUM CHLORIDE 20 ML/HR: 0.9 INJECTION, SOLUTION INTRAVENOUS at 08:25

## 2021-06-11 RX ADMIN — IRON SUCROSE 300 MG: 20 INJECTION, SOLUTION INTRAVENOUS at 08:35

## 2021-06-11 NOTE — PROGRESS NOTES
Patient to [place] for Venofer: Offers no complaints at present time: Lab work ( 05/18/21 ) reviewed:  Within parameters to treat: Left AC PIV initiated without incident

## 2021-06-16 DIAGNOSIS — J30.1 SEASONAL ALLERGIC RHINITIS DUE TO POLLEN: ICD-10-CM

## 2021-06-16 RX ORDER — AZELASTINE 1 MG/ML
SPRAY, METERED NASAL
Qty: 1 ML | Refills: 1 | Status: SHIPPED | OUTPATIENT
Start: 2021-06-16 | End: 2021-06-16

## 2021-06-16 RX ORDER — AZELASTINE 1 MG/ML
SPRAY, METERED NASAL
Qty: 90 ML | Refills: 1 | Status: SHIPPED | OUTPATIENT
Start: 2021-06-16 | End: 2021-10-08

## 2021-06-16 NOTE — PROGRESS NOTES
I just left a voice message for Jaz Dominic and asked that she call back to our office to arrange an appointment for this week or early next week  I did leave our office number  Paula Carballo also left a message requesting a call back to schedule an appointment  We have not received a call back from Jaz Alfaro  I have previously left several phone messages since her initial visit on April 30th asking for a call back to schedule a follow-up appointment  Jazjyoti Alfaro needs B12 injections, follow-up lab testing, including diagnostic testing, and clinical re-evaluation of her status  Please see my note of June 9th for further details  It appears that just ago did have her June 11th infusion with Venofer  She is scheduled for follow-up infusions

## 2021-06-19 ENCOUNTER — HOSPITAL ENCOUNTER (OUTPATIENT)
Dept: INFUSION CENTER | Facility: CLINIC | Age: 31
Discharge: HOME | End: 2021-06-19
Payer: COMMERCIAL

## 2021-06-19 VITALS
HEART RATE: 75 BPM | DIASTOLIC BLOOD PRESSURE: 81 MMHG | SYSTOLIC BLOOD PRESSURE: 120 MMHG | TEMPERATURE: 98.6 F | RESPIRATION RATE: 20 BRPM

## 2021-06-19 DIAGNOSIS — D50.8 OTHER IRON DEFICIENCY ANEMIA: ICD-10-CM

## 2021-06-19 DIAGNOSIS — K90.9 IRON MALABSORPTION: Primary | ICD-10-CM

## 2021-06-19 PROCEDURE — 96366 THER/PROPH/DIAG IV INF ADDON: CPT

## 2021-06-19 PROCEDURE — 96365 THER/PROPH/DIAG IV INF INIT: CPT

## 2021-06-19 RX ORDER — SODIUM CHLORIDE 9 MG/ML
20 INJECTION, SOLUTION INTRAVENOUS ONCE
Status: COMPLETED | OUTPATIENT
Start: 2021-06-19 | End: 2021-06-19

## 2021-06-19 RX ORDER — SODIUM CHLORIDE 9 MG/ML
20 INJECTION, SOLUTION INTRAVENOUS ONCE
Status: CANCELLED | OUTPATIENT
Start: 2021-06-26

## 2021-06-19 RX ADMIN — IRON SUCROSE 300 MG: 20 INJECTION, SOLUTION INTRAVENOUS at 13:49

## 2021-06-19 RX ADMIN — SODIUM CHLORIDE 20 ML/HR: 9 INJECTION, SOLUTION INTRAVENOUS at 13:49

## 2021-06-21 ENCOUNTER — OFFICE VISIT (OUTPATIENT)
Dept: INTERNAL MEDICINE CLINIC | Facility: CLINIC | Age: 31
End: 2021-06-21
Payer: COMMERCIAL

## 2021-06-21 DIAGNOSIS — E53.8 B12 DEFICIENCY: ICD-10-CM

## 2021-06-21 DIAGNOSIS — D50.8 OTHER IRON DEFICIENCY ANEMIA: Primary | ICD-10-CM

## 2021-06-21 DIAGNOSIS — J30.1 SEASONAL ALLERGIC RHINITIS DUE TO POLLEN: ICD-10-CM

## 2021-06-21 DIAGNOSIS — K90.9 IRON MALABSORPTION: ICD-10-CM

## 2021-06-21 PROCEDURE — 1036F TOBACCO NON-USER: CPT | Performed by: INTERNAL MEDICINE

## 2021-06-21 PROCEDURE — 99214 OFFICE O/P EST MOD 30 MIN: CPT | Performed by: INTERNAL MEDICINE

## 2021-06-21 PROCEDURE — 90471 IMMUNIZATION ADMIN: CPT

## 2021-06-21 RX ORDER — CYANOCOBALAMIN 1000 UG/ML
1000 INJECTION INTRAMUSCULAR; SUBCUTANEOUS
Status: CANCELLED | OUTPATIENT
Start: 2021-06-21

## 2021-06-21 RX ORDER — MOMETASONE FUROATE 50 UG/1
2 SPRAY, METERED NASAL DAILY
Qty: 1 ACT | Refills: 1 | Status: SHIPPED | OUTPATIENT
Start: 2021-06-21 | End: 2021-06-22 | Stop reason: SDUPTHER

## 2021-06-21 NOTE — PROGRESS NOTES
INTERNAL MEDICINE OFFICE VISIT       NAME: Nandini Boothe  AGE: 35 y o  SEX: female       : 1990        MRN: [id number]    DATE: 2021  TIME: 3:22 PM    Assessment and Plan   1  Other iron deficiency anemia    2  Iron malabsorption    3  B12 deficiency    4  Seasonal allergic rhinitis due to pollen  -     mometasone (NASONEX) 50 mcg/act nasal spray; 2 sprays into each nostril daily         B12 injection was given today and B12 injections rescheduled for every 2 weeks as nursing visits through this office  The next iron infusion is in 5 days time  Tk Thorpe will be contacted with results of her lab work  If labs are not suggestive of atrophic gastritis/H pylori, CT of the abdomen and pelvis with oral contrast will be ordered  To assist with GI referral ordered at last visit, our office will contact gastroenterology to arrange for a follow-up visit  We discussed the likely possibility of an upper endoscopy  Elevated GEORGE and CRP seem to be nonspecific  There are no stigmata of lupus  Chief Complaint     Chief Complaint   Patient presents with    Follow-up       History of Present Illness   Gertrudis Asif is a 35y o -year-old female who who was last seen a little over 6 weeks ago for chronic fatigue with lab test showing severe iron deficiency anemia and also B12 deficiency with initiation of iron infusion therapy which has been well tolerated  Tk Thorpe was given a B12 injection, and is overdue for her serial injections at this time  She is noticing a significant improvement in energy levels, and is now able to run 1-2 miles with little difficulty  Her general fatigue is lifting  Her appetite remains good and weight is unchanged  We reviewed her history again and she recalls being told she was iron deficient when she was [employer] never had follow-up testing  Lab testing ordered at last visit was negative for celiac disease        She continues to have no abdominal pain, no diarrhea, no nausea or vomiting  There are no fever chills or weight loss  We discussed the possibility of atrophic gastritis causing malabsorption of both iron and B12, and appropriate testing was ordered  Pam Hooevr will be able to drop off for stool sample tomorrow and have lab work drawn than  I will contact her with results  Follow-up with Gastroenterology was ordered at last visit but an appointment was not arranged for some reason  We are going to assist with the appointment  Review of Systems   Review of Systems improved energy and stamina when exercising  No abdominal pain, no nausea vomiting or diarrhea, appetite is excellent  Allergic nasal and sinus congestion are only slightly improved on Astelin nasal spray  Minor asthma symptoms are rare and well controlled with ProAir inhaler  Active Problem List     Patient Active Problem List   Diagnosis    Raynaud's disease without gangrene    Attention deficit hyperactivity disorder (ADHD), combined type    Exercise-induced asthma    Seasonal allergies    Seasonal allergic rhinitis due to pollen    Allergic sinusitis    Iron deficiency anemia    B12 deficiency    Iron malabsorption       The following portions of the patient's history were reviewed and updated as appropriate: allergies, current medications, past family history, past medical history, past social history, past surgical history, and problem list     Objective     Vitals: Wt Readings from Last 3 Encounters:   06/21/21 53 9 kg (118 lb 12 8 oz)   04/30/21 53 6 kg (118 lb 3 2 oz)   12/30/16 58 5 kg (129 lb)       Physical Exam   Vital signs stable, in good spirits in no distress  Weight is unchanged from April 30th  Pulse regular  Skin notable for less pallor, no icterus  There is also less conjunctival pallor  Skin is without rash  Lungs are clear  Cardiac exam normal on auscultation    Abdomen:  Nondistended, normal bowel sounds, soft nontender, no masses, no organomegaly, no surgical scars  Circulation intact  Pertinent Laboratory/Diagnostic Studies:  I have reviewed pertinent labs:  CBC:   Lab Results   Component Value Date    WBC 4 27 (L) 05/18/2021    RBC 4 44 05/18/2021    HGB 8 5 (L) 05/18/2021    HCT 31 9 (L) 05/18/2021    MCV 72 (L) 05/18/2021     (H) 05/18/2021    MCH 19 1 (L) 05/18/2021    MCHC 26 6 (L) 05/18/2021    RDW 19 1 (H) 05/18/2021    MPV 8 4 (L) 05/18/2021    NEUTROABS 1 80 (L) 05/18/2021     CMP:   Lab Results   Component Value Date    SODIUM 140 05/02/2021    K 3 7 05/02/2021     05/02/2021    CO2 26 05/02/2021    AGAP 10 05/02/2021    BUN 8 05/02/2021    CREATININE 0 74 05/02/2021    GLUF 89 05/02/2021    CALCIUM 9 0 05/02/2021    AST 24 05/02/2021    ALT 31 05/02/2021    ALKPHOS 57 05/02/2021    TP 7 3 05/02/2021    ALB 3 7 05/02/2021    TBILI 0 15 (L) 05/02/2021    EGFR 110 05/02/2021     Vitamin B12   Lab Results   Component Value Date    LAIDBUMI71 419 05/18/2021 5/5/21: TSH = 1 640, Fe= 18, PHLQ=513, Fe Saturation= 3%, ferritin=3, WJA=761    5/2: GEORGE= postive, NE allergy panel= + for white curtis, B12= 227, ttg IgG/IgA= <2 and <2, crp=22 0            ALLERGIES:  No Known Allergies    Current Medications     Current Outpatient Medications   Medication Sig Dispense Refill    albuterol (PROVENTIL HFA,VENTOLIN HFA) 90 mcg/act inhaler Inhale 2 puffs every 6 (six) hours as needed for wheezing or shortness of breath 8 5 g 1    amphetamine-dextroamphetamine (ADDERALL XR) 20 MG 24 hr capsule Take 20 mg by mouth every morning      azelastine (ASTELIN) 0 1 % nasal spray Use in each nostril 2 times per day as needed  90 mL 1    etonogestrel-ethinyl estradiol (NUVARING) 0 12-0 015 MG/24HR vaginal ring Insert 1 each into the vagina every 28 days Insert vaginally and leave in place for 3 consecutive weeks, then remove for 1 week        mometasone (NASONEX) 50 mcg/act nasal spray 2 sprays into each nostril daily 1 Act 1     No current facility-administered medications for this visit           Ruth Fernández MD

## 2021-06-22 ENCOUNTER — APPOINTMENT (OUTPATIENT)
Dept: LAB | Facility: HOSPITAL | Age: 31
End: 2021-06-22
Payer: COMMERCIAL

## 2021-06-22 DIAGNOSIS — J30.1 SEASONAL ALLERGIC RHINITIS DUE TO POLLEN: ICD-10-CM

## 2021-06-22 DIAGNOSIS — E53.8 B12 DEFICIENCY: ICD-10-CM

## 2021-06-22 DIAGNOSIS — D50.8 OTHER IRON DEFICIENCY ANEMIA: ICD-10-CM

## 2021-06-22 LAB
FERRITIN SERPL-MCNC: 402 NG/ML (ref 8–388)
IRON SATN MFR SERPL: 23 %
IRON SERPL-MCNC: 89 UG/DL (ref 50–170)
TIBC SERPL-MCNC: 381 UG/DL (ref 250–450)
VIT B12 SERPL-MCNC: 1758 PG/ML (ref 100–900)

## 2021-06-22 PROCEDURE — 86340 INTRINSIC FACTOR ANTIBODY: CPT

## 2021-06-22 PROCEDURE — 82607 VITAMIN B-12: CPT | Performed by: INTERNAL MEDICINE

## 2021-06-22 PROCEDURE — 83550 IRON BINDING TEST: CPT

## 2021-06-22 PROCEDURE — 82728 ASSAY OF FERRITIN: CPT

## 2021-06-22 PROCEDURE — 83540 ASSAY OF IRON: CPT

## 2021-06-22 PROCEDURE — 83516 IMMUNOASSAY NONANTIBODY: CPT

## 2021-06-22 PROCEDURE — 36415 COLL VENOUS BLD VENIPUNCTURE: CPT

## 2021-06-22 RX ORDER — MOMETASONE FUROATE 50 UG/1
2 SPRAY, METERED NASAL DAILY
Qty: 1 ACT | Refills: 1 | Status: SHIPPED | OUTPATIENT
Start: 2021-06-22

## 2021-06-22 RX ORDER — CYANOCOBALAMIN 1000 UG/ML
1000 INJECTION INTRAMUSCULAR; SUBCUTANEOUS
Status: SHIPPED | OUTPATIENT
Start: 2021-06-22

## 2021-06-22 RX ADMIN — CYANOCOBALAMIN 1000 MCG: 1000 INJECTION INTRAMUSCULAR; SUBCUTANEOUS at 09:37

## 2021-06-23 LAB — PCA AB SER-ACNC: 1.2 UNITS (ref 0–20)

## 2021-06-24 LAB — IF BLOCK AB SER QL RIA: 1.1 AU/ML (ref 0–1.1)

## 2021-06-24 NOTE — PROGRESS NOTES
I just left a phone message for Mehdi Keaneuyenlyndsey about her lab work  Her lab work shows significant improvement and red blood count with June 22nd hemoglobin of 11 9, as compared 8 5 on May 18th and 8 1 on May 2nd  Also, her iron studies show a high ferritin level of 402, and normal iron saturation of 23%, normal total iron binding capacity of 381 and normal iron of 89  In addition, the B12 level was high at 1758  In addition, stool H pylori testing was negative and testing for anti parietal antibodies and intrinsic factor blocking antibodies were also negative  Plan:  I did leave a message for Mehdi Keaneuyenlyndsey that she should not have any further iron infusion therapy  Iron infusion will be discontinued  I will ask just because of start a women's multivitamin with iron daily  Mehdi Gotti was to arrange for an appoint with [facility] gastroenterology for follow-up  There is no fluid listed and I will check in with Mehdi Gotti about this appointment  We will also assist Mehdi Gotti in scheduling the CT of the abdomen pelvis ordered on May 6 and declined by her previous insurance company  She has a new insurance company and we will attempt to schedule again  This is to rule out Crohn's disease as a cause of her iron and B12 malabsorption

## 2021-06-25 ENCOUNTER — TELEPHONE (OUTPATIENT)
Dept: INFUSION CENTER | Facility: CLINIC | Age: 31
End: 2021-06-25

## 2021-06-25 ENCOUNTER — TELEPHONE (OUTPATIENT)
Dept: INTERNAL MEDICINE CLINIC | Facility: CLINIC | Age: 31
End: 2021-06-25

## 2021-06-25 DIAGNOSIS — D50.8 OTHER IRON DEFICIENCY ANEMIA: ICD-10-CM

## 2021-06-25 DIAGNOSIS — K90.9 IRON MALABSORPTION: Primary | ICD-10-CM

## 2021-06-25 DIAGNOSIS — E53.8 B12 DEFICIENCY: ICD-10-CM

## 2021-06-25 DIAGNOSIS — E53.8 B12 DEFICIENCY: Primary | ICD-10-CM

## 2021-06-25 RX ORDER — PHENOL 1.4 %
1 AEROSOL, SPRAY (ML) MUCOUS MEMBRANE DAILY
Qty: 90 TABLET | Refills: 3
Start: 2021-06-25 | End: 2021-08-04 | Stop reason: SDUPTHER

## 2021-06-25 NOTE — TELEPHONE ENCOUNTER
DR Britton Peers called to cancel all venofer infusions for Parth Ponce as she does not need this treatment based on her most recent labs  He stated that they have been unsuccessful in reaching her to communicate this to her  He wanted to make sure that the infusion center is aware so that she is not treated in case she shows up  He stated that his office will continue to try and reach her  All infusion appointments have been canceled

## 2021-06-25 NOTE — PROGRESS NOTES
I just spoke to Meghana Bailey over the phone  I reviewed her lab results  I explained there is no evidence of atrophic gastritis or H pylori disease  I explained that iron levels are now back up to normal and red count is markedly improved  I recommended starting a women's multivitamin with iron daily, and repeat lab work in 1 month and will entered orders at this time  I recommended continuing the workup and we will attempt to reschedule the CT scan that was declined by her previous insurance, as ordered in early May of this year, as she now has new insurance, as the next step is to rule out Crohn's disease as a possible reason for her chronic iron B12 malabsorption  I will contact just go with the results of her CT scan and further advise

## 2021-06-25 NOTE — TELEPHONE ENCOUNTER
----- Message from Daniela Arnold MD sent at 6/25/2021 10:52 AM EDT -----  I just spoke to Alisia De La Rosa and would like to proceed with rescheduling her CT of the abdomen and pelvis with oral contrast that was ordered on May 6  Please contact Alisia De La Rosa to help her schedule this test  Thanks

## 2021-06-25 NOTE — TELEPHONE ENCOUNTER
Left patient a detailed voice message with the number to central scheduling to schedule her CT scan, and the option to also call the office to assist with the scheduling

## 2021-06-26 ENCOUNTER — HOSPITAL ENCOUNTER (OUTPATIENT)
Dept: INFUSION CENTER | Facility: CLINIC | Age: 31
End: 2021-06-26

## 2021-07-13 DIAGNOSIS — R10.84 STOMACH CRAMPS, GENERALIZED: Primary | ICD-10-CM

## 2021-07-20 ENCOUNTER — CLINICAL SUPPORT (OUTPATIENT)
Dept: INTERNAL MEDICINE CLINIC | Facility: CLINIC | Age: 31
End: 2021-07-20
Payer: COMMERCIAL

## 2021-07-20 DIAGNOSIS — E53.8 B12 DEFICIENCY: Primary | ICD-10-CM

## 2021-07-20 PROCEDURE — 90471 IMMUNIZATION ADMIN: CPT

## 2021-07-20 RX ADMIN — CYANOCOBALAMIN 1000 MCG: 1000 INJECTION INTRAMUSCULAR; SUBCUTANEOUS at 10:53

## 2021-07-26 ENCOUNTER — TELEPHONE (OUTPATIENT)
Dept: INTERNAL MEDICINE CLINIC | Facility: CLINIC | Age: 31
End: 2021-07-26

## 2021-07-26 NOTE — TELEPHONE ENCOUNTER
Pebbles Grullon from [facility] left a voicemail to request a drug prior authorization for the mometasone  Pt's insurance is [employer]  Thank you!

## 2021-07-29 ENCOUNTER — HOSPITAL ENCOUNTER (OUTPATIENT)
Dept: RADIOLOGY | Facility: HOSPITAL | Age: 31
Discharge: HOME | End: 2021-07-29
Attending: INTERNAL MEDICINE
Payer: COMMERCIAL

## 2021-07-29 DIAGNOSIS — R10.84 STOMACH CRAMPS, GENERALIZED: ICD-10-CM

## 2021-07-29 PROCEDURE — 74177 CT ABD & PELVIS W/CONTRAST: CPT

## 2021-07-29 PROCEDURE — G1004 CDSM NDSC: HCPCS

## 2021-07-29 RX ADMIN — IOHEXOL 100 ML: 350 INJECTION, SOLUTION INTRAVENOUS at 09:34

## 2021-08-04 ENCOUNTER — OFFICE VISIT (OUTPATIENT)
Dept: INTERNAL MEDICINE CLINIC | Facility: CLINIC | Age: 31
End: 2021-08-04
Payer: COMMERCIAL

## 2021-08-04 VITALS
HEART RATE: 92 BPM | HEIGHT: 64 IN | DIASTOLIC BLOOD PRESSURE: 68 MMHG | RESPIRATION RATE: 18 BRPM | BODY MASS INDEX: 20.73 KG/M2 | WEIGHT: 121.4 LBS | SYSTOLIC BLOOD PRESSURE: 110 MMHG | OXYGEN SATURATION: 98 %

## 2021-08-04 DIAGNOSIS — E53.8 B12 DEFICIENCY: Primary | ICD-10-CM

## 2021-08-04 DIAGNOSIS — D50.8 OTHER IRON DEFICIENCY ANEMIA: ICD-10-CM

## 2021-08-04 PROCEDURE — 1036F TOBACCO NON-USER: CPT | Performed by: INTERNAL MEDICINE

## 2021-08-04 PROCEDURE — 99213 OFFICE O/P EST LOW 20 MIN: CPT | Performed by: INTERNAL MEDICINE

## 2021-08-04 PROCEDURE — 3008F BODY MASS INDEX DOCD: CPT | Performed by: INTERNAL MEDICINE

## 2021-08-04 RX ORDER — LANOLIN ALCOHOL/MO/W.PET/CERES
1000 CREAM (GRAM) TOPICAL DAILY
Qty: 30 TABLET | Refills: 3 | Status: SHIPPED | OUTPATIENT
Start: 2021-08-04 | End: 2021-10-25

## 2021-08-04 RX ORDER — PHENOL 1.4 %
1 AEROSOL, SPRAY (ML) MUCOUS MEMBRANE DAILY
Qty: 90 TABLET | Refills: 3
Start: 2021-08-04

## 2021-08-04 NOTE — PROGRESS NOTES
INTERNAL MEDICINE OFFICE VISIT       NAME: Nandini Boothe  AGE: 35 y o  SEX: female       : 1990        MRN: [id number]    DATE: 2021  TIME: 12:05 PM    Assessment and Plan   1  B12 deficiency  -     vitamin B-12 (VITAMIN B-12) 1,000 mcg tablet; Take 1 tablet (1,000 mcg total) by mouth daily  -     Multiple Vitamins-Minerals (Multivitamin Women) TABS; Take 1 tablet by mouth daily    2  Other iron deficiency anemia  -     Multiple Vitamins-Minerals (Multivitamin Women) TABS; Take 1 tablet by mouth daily       Follow-up will be with her new physician when she moves to [facility] within the next week  I reminded Summer Wiggins to have her new physician request records so that continuity of care can be maintained  Just ago for got to have her lab work drawn and promises to undergo fasting lab work tomorrow or the following day  I will notify her of results  I reminded her to continue her women's multivitamin and she states that she has been very compliant  I did add vitamin B12 tablets 1000 mcg per day  Summer Wiggins was unable to comply with B12 injections  I did offer my assistance during her transition as needed  Chief Complaint     Chief Complaint   Patient presents with    Follow-up       History of Present Illness   Aniceto Christiansen is a 35y o -year-old female who is here in follow-up after being seen on    She was to have lab work drawn prior to this visit but forgot  She is very busy and about to moved to [facility]  She has been living here locally for several months but her primary residence is in [place]  She her  will be starting the moving process next week  She is feeling well with restoration of energy levels and excellent exercise tolerance since multiple infusions with venofer  She was unable to comply with vitamin B12 injections  She was started on women's multivitamin last visit once daily and reports good compliance        We reviewed the results of her CT of the abdomen and pelvis from July 29th which was negative  In total, given her lab work, clinical course and presentation overall, including CT scan, diagnosis appears to be chronic severe iron deficiency and B12 deficiency due to substrate deficiency  There is no evidence of any eating disorder  Nevertheless, I did recommend that follow-up with Gastroenterology occur at this point  This could be accomplished through referral with her new physician in [facility]  I did write out instructions for her regarding starting over-the-counter vitamin B12, continuing multivitamin, having lab work drawn in the very near future, that I will contact her with results, that she should establish a new relationship with a new physician in [facility] and then have records transferred for continuing care  Just go understood and agreed with these instructions  Review of Systems   Review of Systems no fatigue, improved exercise tolerance  No GI symptoms  Active Problem List     Patient Active Problem List   Diagnosis    Raynaud's disease without gangrene    Attention deficit hyperactivity disorder (ADHD), combined type    Exercise-induced asthma    Seasonal allergies    Seasonal allergic rhinitis due to pollen    Allergic sinusitis    Iron deficiency anemia    B12 deficiency    Iron malabsorption       The following portions of the patient's history were reviewed and updated as appropriate: allergies, current medications, past family history, past medical history, past social history, past surgical history, and problem list     Objective     Vitals:    08/04/21 1148   BP: 110/68   Pulse: 92   Resp: 18   SpO2: 98%     Wt Readings from Last 3 Encounters:   08/04/21 55 1 kg (121 lb 6 4 oz)   06/21/21 53 9 kg (118 lb 12 8 oz)   04/30/21 53 6 kg (118 lb 3 2 oz)       Physical Exam   Vital signs stable and in no acute distress  Appears well overall  Spirits are good    Skin without pallor and there is no conjunctival pallor  Weight is stable and BMI is normal at 20 84    ALLERGIES:  No Known Allergies    Current Medications     Current Outpatient Medications   Medication Sig Dispense Refill    albuterol (PROVENTIL HFA,VENTOLIN HFA) 90 mcg/act inhaler Inhale 2 puffs every 6 (six) hours as needed for wheezing or shortness of breath 8 5 g 1    amphetamine-dextroamphetamine (ADDERALL XR) 20 MG 24 hr capsule Take 20 mg by mouth every morning      azelastine (ASTELIN) 0 1 % nasal spray Use in each nostril 2 times per day as needed  90 mL 1    etonogestrel-ethinyl estradiol (NUVARING) 0 12-0 015 MG/24HR vaginal ring Insert 1 each into the vagina every 28 days Insert vaginally and leave in place for 3 consecutive weeks, then remove for 1 week        mometasone (NASONEX) 50 mcg/act nasal spray 2 sprays into each nostril daily 1 Act 1    Multiple Vitamins-Minerals (Multivitamin Women) TABS Take 1 tablet by mouth daily 90 tablet 3    vitamin B-12 (VITAMIN B-12) 1,000 mcg tablet Take 1 tablet (1,000 mcg total) by mouth daily 30 tablet 3     Current Facility-Administered Medications   Medication Dose Route Frequency Provider Last Rate Last Admin    cyanocobalamin injection 1,000 mcg  1,000 mcg Intramuscular Q30 Days Pattie Dooley MD   1,000 mcg at 07/20/21 1053         Health Maintenance        Pattie Dooley MD

## 2021-08-05 ENCOUNTER — APPOINTMENT (OUTPATIENT)
Dept: LAB | Facility: HOSPITAL | Age: 31
End: 2021-08-05
Payer: COMMERCIAL

## 2021-08-05 DIAGNOSIS — D50.8 OTHER IRON DEFICIENCY ANEMIA: ICD-10-CM

## 2021-08-05 DIAGNOSIS — K90.9 IRON MALABSORPTION: ICD-10-CM

## 2021-08-05 LAB
BASOPHILS # BLD AUTO: 0.01 THOUSANDS/ΜL (ref 0–0.1)
BASOPHILS NFR BLD AUTO: 0 % (ref 0–1)
EOSINOPHIL # BLD AUTO: 0.05 THOUSAND/ΜL (ref 0–0.61)
EOSINOPHIL NFR BLD AUTO: 1 % (ref 0–6)
ERYTHROCYTE [DISTWIDTH] IN BLOOD BY AUTOMATED COUNT: 16.3 % (ref 11.6–15.1)
FERRITIN SERPL-MCNC: 48 NG/ML (ref 8–388)
HCT VFR BLD AUTO: 39.2 % (ref 34.8–46.1)
HGB BLD-MCNC: 12.3 G/DL (ref 11.5–15.4)
IMM GRANULOCYTES # BLD AUTO: 0.01 THOUSAND/UL (ref 0–0.2)
IMM GRANULOCYTES NFR BLD AUTO: 0 % (ref 0–2)
IRON SATN MFR SERPL: 43 %
IRON SERPL-MCNC: 100 UG/DL (ref 50–170)
LYMPHOCYTES # BLD AUTO: 1.76 THOUSANDS/ΜL (ref 0.6–4.47)
LYMPHOCYTES NFR BLD AUTO: 40 % (ref 14–44)
MCH RBC QN AUTO: 28 PG (ref 26.8–34.3)
MCHC RBC AUTO-ENTMCNC: 31.4 G/DL (ref 31.4–37.4)
MCV RBC AUTO: 89 FL (ref 82–98)
MONOCYTES # BLD AUTO: 0.29 THOUSAND/ΜL (ref 0.17–1.22)
MONOCYTES NFR BLD AUTO: 7 % (ref 4–12)
NEUTROPHILS # BLD AUTO: 2.29 THOUSANDS/ΜL (ref 1.85–7.62)
NEUTS SEG NFR BLD AUTO: 52 % (ref 43–75)
NRBC BLD AUTO-RTO: 0 /100 WBCS
PLATELET # BLD AUTO: 296 THOUSANDS/UL (ref 149–390)
PMV BLD AUTO: 8.4 FL (ref 8.9–12.7)
RBC # BLD AUTO: 4.4 MILLION/UL (ref 3.81–5.12)
TIBC SERPL-MCNC: 235 UG/DL (ref 250–450)
VIT B12 SERPL-MCNC: 299 PG/ML (ref 100–900)
WBC # BLD AUTO: 4.41 THOUSAND/UL (ref 4.31–10.16)

## 2021-08-05 PROCEDURE — 36415 COLL VENOUS BLD VENIPUNCTURE: CPT

## 2021-08-05 PROCEDURE — 82728 ASSAY OF FERRITIN: CPT

## 2021-08-05 PROCEDURE — 85025 COMPLETE CBC W/AUTO DIFF WBC: CPT

## 2021-08-05 PROCEDURE — 83540 ASSAY OF IRON: CPT

## 2021-08-05 PROCEDURE — 82607 VITAMIN B-12: CPT | Performed by: INTERNAL MEDICINE

## 2021-08-05 PROCEDURE — 83550 IRON BINDING TEST: CPT

## 2021-09-22 DIAGNOSIS — I73.00 RAYNAUD'S DISEASE WITHOUT GANGRENE: ICD-10-CM

## 2021-09-22 RX ORDER — ALBUTEROL SULFATE 90 UG/1
AEROSOL, METERED RESPIRATORY (INHALATION)
Qty: 18 G | Refills: 1 | Status: SHIPPED | OUTPATIENT
Start: 2021-09-22 | End: 2022-07-17

## 2021-10-08 DIAGNOSIS — J30.1 SEASONAL ALLERGIC RHINITIS DUE TO POLLEN: ICD-10-CM

## 2021-10-08 RX ORDER — AZELASTINE 1 MG/ML
SPRAY, METERED NASAL
Qty: 1 ML | Refills: 1 | Status: SHIPPED | OUTPATIENT
Start: 2021-10-08 | End: 2021-11-12

## 2021-10-24 DIAGNOSIS — E53.8 B12 DEFICIENCY: ICD-10-CM

## 2021-10-25 RX ORDER — LANOLIN ALCOHOL/MO/W.PET/CERES
CREAM (GRAM) TOPICAL
Qty: 90 TABLET | Refills: 1 | Status: SHIPPED | OUTPATIENT
Start: 2021-10-25

## 2021-11-12 DIAGNOSIS — J30.1 SEASONAL ALLERGIC RHINITIS DUE TO POLLEN: ICD-10-CM

## 2021-11-12 RX ORDER — AZELASTINE 1 MG/ML
SPRAY, METERED NASAL
Qty: 1 ML | Refills: 1 | Status: SHIPPED | OUTPATIENT
Start: 2021-11-12 | End: 2021-11-29

## 2021-11-26 DIAGNOSIS — J30.1 SEASONAL ALLERGIC RHINITIS DUE TO POLLEN: ICD-10-CM

## 2021-11-29 RX ORDER — AZELASTINE 1 MG/ML
SPRAY, METERED NASAL
Qty: 1 ML | Refills: 1 | Status: SHIPPED | OUTPATIENT
Start: 2021-11-29 | End: 2022-03-01

## 2022-03-01 DIAGNOSIS — J30.1 SEASONAL ALLERGIC RHINITIS DUE TO POLLEN: ICD-10-CM

## 2022-03-01 RX ORDER — AZELASTINE 1 MG/ML
SPRAY, METERED NASAL
Qty: 1 ML | Refills: 1 | Status: SHIPPED | OUTPATIENT
Start: 2022-03-01 | End: 2022-03-25

## 2022-03-25 DIAGNOSIS — J30.1 SEASONAL ALLERGIC RHINITIS DUE TO POLLEN: ICD-10-CM

## 2022-03-25 RX ORDER — AZELASTINE 1 MG/ML
SPRAY, METERED NASAL
Qty: 1 ML | Refills: 1 | Status: SHIPPED | OUTPATIENT
Start: 2022-03-25 | End: 2022-04-19

## 2022-04-19 DIAGNOSIS — J30.1 SEASONAL ALLERGIC RHINITIS DUE TO POLLEN: ICD-10-CM

## 2022-04-19 RX ORDER — AZELASTINE 1 MG/ML
SPRAY, METERED NASAL
Qty: 1 ML | Refills: 1 | Status: SHIPPED | OUTPATIENT
Start: 2022-04-19 | End: 2022-05-24

## 2022-05-24 DIAGNOSIS — J30.1 SEASONAL ALLERGIC RHINITIS DUE TO POLLEN: ICD-10-CM

## 2022-05-24 RX ORDER — AZELASTINE 1 MG/ML
SPRAY, METERED NASAL
Qty: 30 ML | Refills: 1 | Status: SHIPPED | OUTPATIENT
Start: 2022-05-24 | End: 2022-06-18

## 2022-06-18 DIAGNOSIS — J30.1 SEASONAL ALLERGIC RHINITIS DUE TO POLLEN: ICD-10-CM

## 2022-06-18 RX ORDER — AZELASTINE 1 MG/ML
SPRAY, METERED NASAL
Qty: 30 ML | Refills: 1 | Status: SHIPPED | OUTPATIENT
Start: 2022-06-18 | End: 2022-07-17

## 2022-07-08 DIAGNOSIS — I73.00 RAYNAUD'S DISEASE WITHOUT GANGRENE: ICD-10-CM

## 2022-07-17 DIAGNOSIS — J30.1 SEASONAL ALLERGIC RHINITIS DUE TO POLLEN: ICD-10-CM

## 2022-07-17 RX ORDER — AZELASTINE 1 MG/ML
SPRAY, METERED NASAL
Qty: 30 ML | Refills: 1 | Status: SHIPPED | OUTPATIENT
Start: 2022-07-17

## 2022-07-17 RX ORDER — ALBUTEROL SULFATE 90 UG/1
AEROSOL, METERED RESPIRATORY (INHALATION)
Qty: 8.5 G | Refills: 1 | Status: SHIPPED | OUTPATIENT
Start: 2022-07-17

## 2022-10-12 PROBLEM — J30.9 ALLERGIC SINUSITIS: Status: RESOLVED | Noted: 2021-04-30 | Resolved: 2022-10-12

## 2023-11-14 NOTE — TELEPHONE ENCOUNTER
DDD - Degenerative disc disease of the )/Cervical (C)  spine. Educate exercises and referred patient to Physical Therapy (PT). Ordered X-Ray's of the /C spine. Consider MRI. Radiculopathy in the distribution of C4-C5-C6 nerve roots, needs NSAIDS (Naprosin 500mg BID vs. Arthrotec 75mg BID or Prednisone taper (Medrol dose pack), Flexeril 10 mg qHS, heat application, and pain control with Tylenol 325 mg TID.      Pre-auth was done for pts mometasone   Waiting on a response

## 2024-10-02 ENCOUNTER — OFFICE VISIT (OUTPATIENT)
Age: 34
End: 2024-10-02
Payer: COMMERCIAL

## 2024-10-02 ENCOUNTER — APPOINTMENT (OUTPATIENT)
Dept: LAB | Facility: CLINIC | Age: 34
End: 2024-10-02
Payer: COMMERCIAL

## 2024-10-02 VITALS
HEART RATE: 72 BPM | BODY MASS INDEX: 21.85 KG/M2 | OXYGEN SATURATION: 99 % | DIASTOLIC BLOOD PRESSURE: 80 MMHG | WEIGHT: 128 LBS | SYSTOLIC BLOOD PRESSURE: 122 MMHG | HEIGHT: 64 IN

## 2024-10-02 DIAGNOSIS — Z13.1 SCREENING FOR DIABETES MELLITUS: ICD-10-CM

## 2024-10-02 DIAGNOSIS — Z13.220 SCREENING FOR HYPERLIPIDEMIA: ICD-10-CM

## 2024-10-02 DIAGNOSIS — R25.1 TREMOR: ICD-10-CM

## 2024-10-02 DIAGNOSIS — I73.00 RAYNAUD'S DISEASE WITHOUT GANGRENE: ICD-10-CM

## 2024-10-02 DIAGNOSIS — Z76.89 ENCOUNTER TO ESTABLISH CARE: Primary | ICD-10-CM

## 2024-10-02 DIAGNOSIS — F90.2 ATTENTION DEFICIT HYPERACTIVITY DISORDER (ADHD), COMBINED TYPE: ICD-10-CM

## 2024-10-02 DIAGNOSIS — E53.8 B12 DEFICIENCY: ICD-10-CM

## 2024-10-02 DIAGNOSIS — D50.8 OTHER IRON DEFICIENCY ANEMIA: ICD-10-CM

## 2024-10-02 DIAGNOSIS — J30.1 SEASONAL ALLERGIC RHINITIS DUE TO POLLEN: ICD-10-CM

## 2024-10-02 DIAGNOSIS — J45.990 EXERCISE-INDUCED ASTHMA: ICD-10-CM

## 2024-10-02 LAB
ALBUMIN SERPL BCG-MCNC: 4.3 G/DL (ref 3.5–5)
ALP SERPL-CCNC: 34 U/L (ref 34–104)
ALT SERPL W P-5'-P-CCNC: 23 U/L (ref 7–52)
ANION GAP SERPL CALCULATED.3IONS-SCNC: 8 MMOL/L (ref 4–13)
AST SERPL W P-5'-P-CCNC: 22 U/L (ref 13–39)
BASOPHILS # BLD AUTO: 0.01 THOUSANDS/ÂΜL (ref 0–0.1)
BASOPHILS NFR BLD AUTO: 0 % (ref 0–1)
BILIRUB SERPL-MCNC: 0.3 MG/DL (ref 0.2–1)
BILIRUB UR QL STRIP: NEGATIVE
BUN SERPL-MCNC: 11 MG/DL (ref 5–25)
CALCIUM SERPL-MCNC: 8.8 MG/DL (ref 8.4–10.2)
CHLORIDE SERPL-SCNC: 105 MMOL/L (ref 96–108)
CHOLEST SERPL-MCNC: 186 MG/DL
CLARITY UR: CLEAR
CO2 SERPL-SCNC: 25 MMOL/L (ref 21–32)
COLOR UR: NORMAL
CREAT SERPL-MCNC: 0.7 MG/DL (ref 0.6–1.3)
CRP SERPL QL: <1 MG/L
EOSINOPHIL # BLD AUTO: 0.1 THOUSAND/ÂΜL (ref 0–0.61)
EOSINOPHIL NFR BLD AUTO: 2 % (ref 0–6)
ERYTHROCYTE [DISTWIDTH] IN BLOOD BY AUTOMATED COUNT: 14.6 % (ref 11.6–15.1)
FERRITIN SERPL-MCNC: 3 NG/ML (ref 11–307)
GFR SERPL CREATININE-BSD FRML MDRD: 114 ML/MIN/1.73SQ M
GLUCOSE SERPL-MCNC: 94 MG/DL (ref 65–140)
GLUCOSE UR STRIP-MCNC: NEGATIVE MG/DL
HCT VFR BLD AUTO: 29.1 % (ref 34.8–46.1)
HDLC SERPL-MCNC: 92 MG/DL
HGB BLD-MCNC: 8.1 G/DL (ref 11.5–15.4)
HGB UR QL STRIP.AUTO: NEGATIVE
IMM GRANULOCYTES # BLD AUTO: 0.01 THOUSAND/UL (ref 0–0.2)
IMM GRANULOCYTES NFR BLD AUTO: 0 % (ref 0–2)
IRON SERPL-MCNC: <10 UG/DL (ref 50–212)
KETONES UR STRIP-MCNC: NEGATIVE MG/DL
LDLC SERPL CALC-MCNC: 84 MG/DL (ref 0–100)
LEUKOCYTE ESTERASE UR QL STRIP: NEGATIVE
LYMPHOCYTES # BLD AUTO: 1.17 THOUSANDS/ÂΜL (ref 0.6–4.47)
LYMPHOCYTES NFR BLD AUTO: 28 % (ref 14–44)
MCH RBC QN AUTO: 22.4 PG (ref 26.8–34.3)
MCHC RBC AUTO-ENTMCNC: 27.8 G/DL (ref 31.4–37.4)
MCV RBC AUTO: 80 FL (ref 82–98)
MONOCYTES # BLD AUTO: 0.63 THOUSAND/ÂΜL (ref 0.17–1.22)
MONOCYTES NFR BLD AUTO: 15 % (ref 4–12)
NEUTROPHILS # BLD AUTO: 2.21 THOUSANDS/ÂΜL (ref 1.85–7.62)
NEUTS SEG NFR BLD AUTO: 55 % (ref 43–75)
NITRITE UR QL STRIP: NEGATIVE
NONHDLC SERPL-MCNC: 94 MG/DL
NRBC BLD AUTO-RTO: 0 /100 WBCS
PH UR STRIP.AUTO: 6.5 [PH]
PLATELET # BLD AUTO: 368 THOUSANDS/UL (ref 149–390)
PMV BLD AUTO: 8.7 FL (ref 8.9–12.7)
POTASSIUM SERPL-SCNC: 3.7 MMOL/L (ref 3.5–5.3)
PROT SERPL-MCNC: 6.4 G/DL (ref 6.4–8.4)
PROT UR STRIP-MCNC: NEGATIVE MG/DL
RBC # BLD AUTO: 3.62 MILLION/UL (ref 3.81–5.12)
SODIUM SERPL-SCNC: 138 MMOL/L (ref 135–147)
SP GR UR STRIP.AUTO: 1.02 (ref 1–1.03)
TRIGL SERPL-MCNC: 52 MG/DL
UIBC SERPL-MCNC: 448 UG/DL (ref 155–355)
UROBILINOGEN UR STRIP-ACNC: <2 MG/DL
VIT B12 SERPL-MCNC: 868 PG/ML (ref 180–914)
WBC # BLD AUTO: 4.13 THOUSAND/UL (ref 4.31–10.16)

## 2024-10-02 PROCEDURE — 82728 ASSAY OF FERRITIN: CPT

## 2024-10-02 PROCEDURE — 36415 COLL VENOUS BLD VENIPUNCTURE: CPT

## 2024-10-02 PROCEDURE — 99214 OFFICE O/P EST MOD 30 MIN: CPT | Performed by: INTERNAL MEDICINE

## 2024-10-02 PROCEDURE — 85025 COMPLETE CBC W/AUTO DIFF WBC: CPT

## 2024-10-02 PROCEDURE — 83540 ASSAY OF IRON: CPT

## 2024-10-02 PROCEDURE — 82607 VITAMIN B-12: CPT

## 2024-10-02 PROCEDURE — 83550 IRON BINDING TEST: CPT

## 2024-10-02 PROCEDURE — 80053 COMPREHEN METABOLIC PANEL: CPT

## 2024-10-02 PROCEDURE — 80061 LIPID PANEL: CPT

## 2024-10-02 PROCEDURE — 81003 URINALYSIS AUTO W/O SCOPE: CPT

## 2024-10-02 PROCEDURE — 86140 C-REACTIVE PROTEIN: CPT

## 2024-10-02 RX ORDER — AMITRIPTYLINE HYDROCHLORIDE 50 MG/1
50 TABLET ORAL
COMMUNITY
Start: 2024-08-21

## 2024-10-02 NOTE — PROGRESS NOTES
Ambulatory Visit  Name: Nandini Boothe      : 1990      MRN: [id number]  Encounter Provider: Mariusz Marin MD  Encounter Date: 10/2/2024   Encounter department: [facility] INTERNAL MEDICINE    Assessment & Plan  Encounter to establish care  Nandini is a 34 year old female patient who comes in today re establish care at the office. She was last seen in  when she had iron /B12 deficiency anemia. She received iron infusions and B12 therapies, her levels went back to normal.  she was referred to see the gastroenterologist which is yet to do.     Today Patient complains of mental fogginess/fatigue worse in the early mornings, she sleeps averagely for 6 to 7 hrs per night, she feels un refreshed on awakening, occasionally snores and has some apneic episodes as told by her partner who records her sometimes. She also complains of leg discomforts before she sleeps which is only relieved by moving her legs, sometimes walking around in the room or lying down and raising the legs.     She also describes dizziness on standing from a sited position, she drinks about 2L of water everyday, eats a healthy diet composed of vegetables, chicken,fruits. We did orthostatic vitals which was negative for postural hypotension with blood pressures on sitting of 120/80, BP 5 mins after standing of 110/80.    Patient also complains of new onset ice cravings which she's been eating a lot of, I have explained to her this could be pica due possible iron deficiency.     Patient has new onset bilateral hand tremors worse with activities, ongoing for the past 2 years, which has been bothersome and worsening, has become noticeable by friends and families, she hasn't self medicated with anything, denies family history of tremors. We have sent in a referral to see a neurologist.       Other iron deficiency anemia  She was treated for iron deficiency anemia in , was referred to see the gastroenterologist but she is yet to do.    Today she comes in with complaints of fatigue/mental fogginess/pica/orthostatic dizziness/ melena stool/worsening hand tremors/restless leg at night.   She has constipation with bowel movement of 3 to 4 times per week, denies nausea, vomiting, abdominal pains, hematochezia, hemorrhoids.   Patient would benefit from a GI visit.   Orders:    CBC and differential    Comprehensive metabolic panel    Iron Panel (Includes Ferritin, Iron Sat%, Iron, and TIBC); Future    C-reactive protein; Future    Ambulatory Referral to Gastroenterology; Future    B12 deficiency  Had past history of B12 deficiency. Was on B12 supplements but she has defaulted some time.  She complains of worsening bilateral intention hand tremors. We have discussed that if B12 levels comes back abnormal we will hold off neurology referral for the hand tremors until she is treated for the B12 deficiency.     Orders:    CBC and differential    Vitamin B12    Exercise-induced asthma  She often gets SOB and wheezing on exercise, uses albuterol with symptoms.   Otherwise, she feels fine and without symptoms outside of exercise period.        Attention deficit hyperactivity disorder (ADHD), combined type  During today's visit patient did have symptoms of ADHD.   Patient is on Adderal 20 mg daily   She reports not taking her medications lately       Raynaud's disease without gangrene  Patient has occasional symptoms with cold, bluish discoloration of her fingers. We have discussed supportive measures of keeping her hands warm during cold temperature and avoiding extreme temperature exposures.        Screening for diabetes mellitus  Orders:    Comprehensive metabolic panel    UA (URINE) with reflex to Scope; Future    Screening for hyperlipidemia    Orders:    Lipid panel    Tremor  Patient has noticed worsening intention tremors in the hands worsened with anxiety, has become noticeable to friends and families. Has become really bothersome.  We have discussed  "a neurology referral which is agreeable to.   Orders:    Ambulatory Referral to Neurology; Future       History of Present Illness     HPI      Review of Systems   Constitutional:  Negative for chills and fever.   HENT:  Negative for ear pain and sore throat.    Eyes:  Negative for pain and visual disturbance.   Respiratory:  Negative for cough and shortness of breath.    Cardiovascular:  Negative for chest pain and palpitations.   Gastrointestinal:  Positive for blood in stool and constipation. Negative for abdominal pain and vomiting.   Endocrine: Negative for polydipsia, polyphagia and polyuria.   Genitourinary:  Negative for dysuria and hematuria.   Musculoskeletal:  Negative for arthralgias and back pain.   Skin:  Negative for color change and rash.   Allergic/Immunologic: Negative for food allergies.   Neurological:  Positive for dizziness, tremors and light-headedness. Negative for seizures and syncope.   Psychiatric/Behavioral:  The patient is nervous/anxious.    All other systems reviewed and are negative.          Objective     /80   Pulse 72   Ht 5' 4\" (1.626 m)   Wt 58.1 kg (128 lb)   SpO2 99%   BMI 21.97 kg/m²     Physical Exam  Vitals and nursing note reviewed.   Constitutional:       General: She is not in acute distress.     Appearance: Normal appearance. She is well-developed.   HENT:      Head: Normocephalic and atraumatic.   Eyes:      Conjunctiva/sclera: Conjunctivae normal.   Cardiovascular:      Rate and Rhythm: Normal rate and regular rhythm.      Heart sounds: No murmur heard.  Pulmonary:      Effort: Pulmonary effort is normal. No respiratory distress.      Breath sounds: Normal breath sounds.   Abdominal:      Palpations: Abdomen is soft.      Tenderness: There is no abdominal tenderness.   Musculoskeletal:         General: No swelling.      Cervical back: Neck supple.   Skin:     General: Skin is warm and dry.      Capillary Refill: Capillary refill takes less than 2 seconds. " "  Neurological:      Mental Status: She is alert.      Comments: Has bilateral intention hand tremors    Psychiatric:         Mood and Affect: Mood normal.        Nutrition Assessment and Intervention:     Online resources such as NutritionFacts.org, [place]OverKnives.com, plantstrong.com, pcrm.org, or similar provided to patient    Nutrition-related book recommendations provided to patient       Books:  \"How Not To Die\" - Topher Hennessy MD    Videos:  \"[place] Over [employer]ives\"    Websites:  www.nutritionfacts.org     "

## 2024-10-02 NOTE — ASSESSMENT & PLAN NOTE
She often gets SOB and wheezing on exercise, uses albuterol with symptoms.   Otherwise, she feels fine and without symptoms outside of exercise period.

## 2024-10-02 NOTE — ASSESSMENT & PLAN NOTE
Had past history of B12 deficiency. Was on B12 supplements but she has defaulted some time.  She complains of worsening bilateral intention hand tremors. We have discussed that if B12 levels comes back abnormal we will hold off neurology referral for the hand tremors until she is treated for the B12 deficiency.     Orders:    CBC and differential    Vitamin B12

## 2024-10-02 NOTE — ASSESSMENT & PLAN NOTE
She was treated for iron deficiency anemia in 2021, was referred to see the gastroenterologist but she is yet to do.   Today she comes in with complaints of fatigue/mental fogginess/pica/orthostatic dizziness/ melena stool/worsening hand tremors/restless leg at night.   She has constipation with bowel movement of 3 to 4 times per week, denies nausea, vomiting, abdominal pains, hematochezia, hemorrhoids.   Patient would benefit from a GI visit.   Orders:    CBC and differential    Comprehensive metabolic panel    Iron Panel (Includes Ferritin, Iron Sat%, Iron, and TIBC); Future    C-reactive protein; Future    Ambulatory Referral to Gastroenterology; Future

## 2024-10-02 NOTE — ASSESSMENT & PLAN NOTE
During today's visit patient did have symptoms of ADHD.   Patient is on Adderal 20 mg daily   She reports not taking her medications lately

## 2024-10-02 NOTE — ASSESSMENT & PLAN NOTE
Patient has occasional symptoms with cold, bluish discoloration of her fingers. We have discussed supportive measures of keeping her hands warm during cold temperature and avoiding extreme temperature exposures.

## 2024-10-03 DIAGNOSIS — D50.8 OTHER IRON DEFICIENCY ANEMIA: Primary | ICD-10-CM

## 2024-10-03 RX ORDER — MULTIVITAMIN
1 TABLET ORAL DAILY
Start: 2024-10-03

## 2024-10-03 RX ORDER — FERROUS SULFATE 324(65)MG
TABLET, DELAYED RELEASE (ENTERIC COATED) ORAL
Qty: 15 TABLET | Refills: 2 | Status: SHIPPED | OUTPATIENT
Start: 2024-10-03

## 2024-10-07 ENCOUNTER — PATIENT MESSAGE (OUTPATIENT)
Age: 34
End: 2024-10-07

## 2024-10-23 DIAGNOSIS — D72.821 MONOCYTOSIS: Primary | ICD-10-CM

## 2024-10-25 ENCOUNTER — TELEMEDICINE (OUTPATIENT)
Age: 34
End: 2024-10-25

## 2024-10-25 DIAGNOSIS — D50.8 OTHER IRON DEFICIENCY ANEMIA: Primary | ICD-10-CM

## 2024-10-25 PROCEDURE — RECHECK: Performed by: INTERNAL MEDICINE

## 2024-10-25 RX ORDER — SODIUM CHLORIDE 9 MG/ML
20 INJECTION, SOLUTION INTRAVENOUS ONCE
OUTPATIENT
Start: 2024-10-25

## 2024-10-25 RX ORDER — METHYLPREDNISOLONE 4 MG/1
4 TABLET ORAL DAILY
COMMUNITY
Start: 2024-10-08

## 2024-10-25 NOTE — PROGRESS NOTES
I spoke to Nandini to follow-up to iron deficiency anemia, longstanding without exact diagnosis.  Nandini states that she has been taking iron supplements without improvement in symptoms over the last several weeks.    This is a longstanding diagnosis, with workup prior to her leaving for [facility] in 2021.  While in [place], Nandini continued to feel poorly and did not pursue further diagnostic testing or any treatment.    Nandini did start iron sulfate as recommended on October 3 but is not feeling better.    I recommended starting iron infusion therapy and follow-up with gastroenterology to complete her workup.  She is agreeable.    Giovani lives in [facility] but wants to continue her care here.  We discussed that I am placing orders for iron therapy with Venofer.  I informed Nandini that she should receive a phone call from the infusion center and also from gastroenterology.    In addition, I discussed that I will follow her progress via updates from the infusion center, including lab work, and from gastroenterology.    Nandini is agreeable to this plan.

## 2024-11-07 ENCOUNTER — HOSPITAL ENCOUNTER (OUTPATIENT)
Dept: INFUSION CENTER | Facility: CLINIC | Age: 34
Discharge: HOME | End: 2024-11-07

## 2024-11-08 ENCOUNTER — TELEPHONE (OUTPATIENT)
Dept: INFUSION CENTER | Facility: HOSPITAL | Age: 34
End: 2024-11-08

## 2024-11-08 NOTE — TELEPHONE ENCOUNTER
Received call from patient's mother, Brenda, wanting to schedule iron infusions for her daughter on Saturdays. The pt currently lives in [facility]. I informed Brenda that the pt would have to have at least one iron infusion on a weekday. If she tolerates the first infusion well, then we can discuss the possibility of Saturdays. She will call us back after she has spoken to her daughter.

## 2024-11-12 DIAGNOSIS — D50.8 OTHER IRON DEFICIENCY ANEMIA: Primary | ICD-10-CM

## 2024-11-12 RX ORDER — SODIUM CHLORIDE 9 MG/ML
20 INJECTION, SOLUTION INTRAVENOUS ONCE
Status: CANCELLED | OUTPATIENT
Start: 2024-11-15

## 2024-11-15 ENCOUNTER — HOSPITAL ENCOUNTER (OUTPATIENT)
Dept: INFUSION CENTER | Facility: CLINIC | Age: 34
End: 2024-11-15
Payer: COMMERCIAL

## 2024-11-15 VITALS
DIASTOLIC BLOOD PRESSURE: 69 MMHG | HEART RATE: 99 BPM | RESPIRATION RATE: 16 BRPM | SYSTOLIC BLOOD PRESSURE: 113 MMHG | TEMPERATURE: 98.9 F

## 2024-11-15 DIAGNOSIS — D50.8 OTHER IRON DEFICIENCY ANEMIA: Primary | ICD-10-CM

## 2024-11-15 PROCEDURE — 96365 THER/PROPH/DIAG IV INF INIT: CPT

## 2024-11-15 RX ORDER — SODIUM CHLORIDE 9 MG/ML
20 INJECTION, SOLUTION INTRAVENOUS ONCE
Status: COMPLETED | OUTPATIENT
Start: 2024-11-15 | End: 2024-11-15

## 2024-11-15 RX ORDER — SODIUM CHLORIDE 9 MG/ML
20 INJECTION, SOLUTION INTRAVENOUS ONCE
OUTPATIENT
Start: 2024-12-25

## 2024-11-15 RX ADMIN — IRON SUCROSE 100 MG: 20 INJECTION, SOLUTION INTRAVENOUS at 09:30

## 2024-11-15 RX ADMIN — SODIUM CHLORIDE 20 ML/HR: 0.9 INJECTION, SOLUTION INTRAVENOUS at 09:30

## 2024-11-15 NOTE — PROGRESS NOTES
Pt tolerated venofer without incident. Pt declined AVS, aware of next appt at [facility] on 12/14 at 12:30pm.

## 2024-11-27 ENCOUNTER — OFFICE VISIT (OUTPATIENT)
Dept: GASTROENTEROLOGY | Facility: MEDICAL CENTER | Age: 34
End: 2024-11-27
Payer: COMMERCIAL

## 2024-11-27 VITALS
SYSTOLIC BLOOD PRESSURE: 130 MMHG | BODY MASS INDEX: 22.13 KG/M2 | TEMPERATURE: 98.9 F | OXYGEN SATURATION: 99 % | DIASTOLIC BLOOD PRESSURE: 78 MMHG | HEIGHT: 64 IN | HEART RATE: 104 BPM | WEIGHT: 129.6 LBS

## 2024-11-27 DIAGNOSIS — D50.9 IRON DEFICIENCY ANEMIA, UNSPECIFIED IRON DEFICIENCY ANEMIA TYPE: Primary | ICD-10-CM

## 2024-11-27 DIAGNOSIS — K21.9 GASTROESOPHAGEAL REFLUX DISEASE, UNSPECIFIED WHETHER ESOPHAGITIS PRESENT: ICD-10-CM

## 2024-11-27 DIAGNOSIS — D50.8 OTHER IRON DEFICIENCY ANEMIA: ICD-10-CM

## 2024-11-27 PROCEDURE — 99204 OFFICE O/P NEW MOD 45 MIN: CPT | Performed by: PHYSICIAN ASSISTANT

## 2024-11-27 RX ORDER — SODIUM CHLORIDE, SODIUM LACTATE, POTASSIUM CHLORIDE, CALCIUM CHLORIDE 600; 310; 30; 20 MG/100ML; MG/100ML; MG/100ML; MG/100ML
125 INJECTION, SOLUTION INTRAVENOUS CONTINUOUS
OUTPATIENT
Start: 2024-11-27

## 2024-11-27 RX ORDER — FAMOTIDINE 40 MG/1
40 TABLET, FILM COATED ORAL DAILY
Qty: 30 TABLET | Refills: 2 | Status: SHIPPED | OUTPATIENT
Start: 2024-11-27

## 2024-11-27 NOTE — PROGRESS NOTES
"Name: Nandini Boothe      : 1990      MRN: [id number]  Encounter Provider: Gregoria Jolley PA-C  Encounter Date: 2024   Encounter department: [facility]  :  Assessment & Plan  Iron deficiency anemia, unspecified iron deficiency anemia type  She was treated for iron deficiency anemia in .  Has had intermittent bouts of this since, most recent blood work with HGB 8.1, iron < 10, TIBC 448, iron sat 43, and ferritin 3. Has been started on IV iron. She states that she did undergo EGD and colonoscopy for evaluation of this in 2024 and results were \"inconclusive\" and her colonoscopy prep may have been incomplete. Records not available. Will obtain records and if EGD and colonoscopy normal proceed with capsule endoscopy. If abnormal will address at that time.       Gastroesophageal reflux disease, unspecified whether esophagitis present  Chronic. Daily. Will start famotidine and if ineffective increase to PPI although omeprazole caused bloating in the past. Will obtain prior EGD records as well.  Orders:    famotidine (PEPCID) 40 MG tablet; Take 1 tablet (40 mg total) by mouth daily      Follow up depending on records       History of Present Illness     HPI  Nandini Boothe is a 34 y.o. female who presents for evaluation of iron deficiency anemia.  The patient was diagnosed with this initially in  and has had intermittent bouts since.  She denies any overt GI bleeding or menorrhagia.  Has no GI symptoms or complaints aside for GERD.  She gets GERD on a daily basis.  She trialed over-the-counter omeprazole but this caused bloating.  She is now taking Tums as needed.  She does report seeing a gastroenterologist in [facility] where she lives.  She states that she had an EGD and colonoscopy done in 2024.  She states that the results were inconclusive and that she might not have cleaned out all the way for colonoscopy.  She also reports possible " esophagitis and gastritis.      Review of Systems   Constitutional:  Negative for activity change, appetite change, chills, fatigue, fever and unexpected weight change.   Gastrointestinal:  Negative for abdominal distention, abdominal pain, anal bleeding, blood in stool, constipation, diarrhea, nausea, rectal pain and vomiting.   Musculoskeletal:  Negative for back pain and gait problem.   Psychiatric/Behavioral:  Negative for confusion.        Medical History Reviewed by provider this encounter:     .  Current Outpatient Medications on File Prior to Visit   Medication Sig Dispense Refill    albuterol (PROVENTIL HFA,VENTOLIN HFA) 90 mcg/act inhaler INHALE 2 PUFF EVERY 6 HOURS AS NEEDED FOR WEEZING OR SHORTNESS OF BREATH 8.5 g 1    amitriptyline (ELAVIL) 50 mg tablet Take 50 mg by mouth daily at bedtime      ferrous sulfate 324 (65 Fe) mg Take one tablet every other day in the morning with 500 mg of Vitamin C. Do not drink and tea containing drinks (iced tea or hot tea). 15 tablet 2    mometasone (NASONEX) 50 mcg/act nasal spray 2 sprays into each nostril daily 1 Act 1    Multiple Vitamins-Minerals (Multivitamin Women) TABS Take 1 tablet by mouth daily 90 tablet 3    amphetamine-dextroamphetamine (ADDERALL XR) 20 MG 24 hr capsule Take 20 mg by mouth every morning      azelastine (ASTELIN) 0.1 % nasal spray USE 1 SPRAY IN EACH NOSTRIL 2 TIMES A DAY AS DIRECTED 30 mL 1    methylPREDNISolone 4 MG tablet therapy pack Take 4 mg by mouth daily      Multiple Vitamin (multivitamin) tablet Take 1 tablet by mouth daily      vitamin B-12 (VITAMIN B-12) 1,000 mcg tablet TAKE 1 TABLET BY MOUTH EVERY DAY 90 tablet 1     Current Facility-Administered Medications on File Prior to Visit   Medication Dose Route Frequency Provider Last Rate Last Admin    cyanocobalamin injection 1,000 mcg  1,000 mcg Intramuscular Q30 Days Mariusz Marin MD   1,000 mcg at 07/20/21 1053      Social History     Tobacco Use    Smoking status: Never     Smokeless tobacco: Never   Vaping Use    Vaping status: Never Used   Substance and Sexual Activity    Alcohol use: Yes     Comment: social    Drug use: Never    Sexual activity: Not on file        Objective   There were no vitals taken for this visit.       Physical Exam  Constitutional:       General: She is not in acute distress.     Appearance: Normal appearance. She is normal weight. She is not ill-appearing, toxic-appearing or diaphoretic.   HENT:      Head: Normocephalic and atraumatic.      Nose: No congestion or rhinorrhea.   Eyes:      General:         Right eye: No discharge.         Left eye: No discharge.   Pulmonary:      Effort: Pulmonary effort is normal.   Musculoskeletal:         General: Normal range of motion.      Cervical back: Normal range of motion.   Skin:     Coloration: Skin is not jaundiced.   Neurological:      Mental Status: She is alert.   Psychiatric:         Mood and Affect: Mood normal.         Behavior: Behavior normal.         Thought Content: Thought content normal.         Judgment: Judgment normal.

## 2024-11-27 NOTE — ASSESSMENT & PLAN NOTE
"She was treated for iron deficiency anemia in 2021.  Has had intermittent bouts of this since, most recent blood work with HGB 8.1, iron < 10, TIBC 448, iron sat 43, and ferritin 3. Has been started on IV iron. She states that she did undergo EGD and colonoscopy for evaluation of this in January 2024 and results were \"inconclusive\" and her colonoscopy prep may have been incomplete. Records not available. Will obtain records and if EGD and colonoscopy normal proceed with capsule endoscopy. If abnormal will address at that time.       "

## 2024-11-29 ENCOUNTER — TELEPHONE (OUTPATIENT)
Dept: GASTROENTEROLOGY | Facility: MEDICAL CENTER | Age: 34
End: 2024-11-29

## 2024-12-02 NOTE — TELEPHONE ENCOUNTER
Called the patient a second time and left another VM indicating that we are reaching out to find out which hospital/ doctor she saw for her procedures in [facility] so we can fax them the medical information release for to get records for our office. Provided office callback number.

## 2024-12-11 ENCOUNTER — TELEPHONE (OUTPATIENT)
Dept: GASTROENTEROLOGY | Facility: MEDICAL CENTER | Age: 34
End: 2024-12-11

## 2024-12-11 DIAGNOSIS — K21.9 GASTROESOPHAGEAL REFLUX DISEASE, UNSPECIFIED WHETHER ESOPHAGITIS PRESENT: ICD-10-CM

## 2024-12-11 RX ORDER — FAMOTIDINE 40 MG/1
40 TABLET, FILM COATED ORAL DAILY
Qty: 90 TABLET | Refills: 1 | Status: SHIPPED | OUTPATIENT
Start: 2024-12-11

## 2024-12-11 NOTE — TELEPHONE ENCOUNTER
Called patient's provider's office of Dr. Topher Tim of [facility] and spoke with Balbir. Was provided with fax number [phone] to send medical records release form.    Faxed the release form to the office with [facility] fax number for records response and received confirmation print that fax went through.

## 2024-12-14 ENCOUNTER — HOSPITAL ENCOUNTER (OUTPATIENT)
Dept: INFUSION CENTER | Facility: HOSPITAL | Age: 34
Discharge: HOME | End: 2024-12-14

## 2024-12-18 DIAGNOSIS — D50.8 OTHER IRON DEFICIENCY ANEMIA: Primary | ICD-10-CM

## 2024-12-18 PROBLEM — F90.2 ATTENTION DEFICIT HYPERACTIVITY DISORDER (ADHD), COMBINED TYPE: Status: RESOLVED | Noted: 2021-04-30 | Resolved: 2024-12-18

## 2024-12-18 RX ORDER — SODIUM CHLORIDE 9 MG/ML
20 INJECTION, SOLUTION INTRAVENOUS ONCE
Status: CANCELLED | OUTPATIENT
Start: 2024-12-20

## 2024-12-19 ENCOUNTER — TELEPHONE (OUTPATIENT)
Dept: INFUSION CENTER | Facility: HOSPITAL | Age: 34
End: 2024-12-19

## 2024-12-19 NOTE — TELEPHONE ENCOUNTER
Patient called to reschedule apt for tomorrow.  Patient comes from [facility] and takes bus into [facility].  She is agreeable to [place] [facility].  Patient scheduled 12noon at [facility] on 12/26/24.  She was provided with address and directions.

## 2024-12-20 ENCOUNTER — HOSPITAL ENCOUNTER (OUTPATIENT)
Dept: INFUSION CENTER | Facility: HOSPITAL | Age: 34
Discharge: HOME | End: 2024-12-20

## 2024-12-26 ENCOUNTER — HOSPITAL ENCOUNTER (OUTPATIENT)
Dept: INFUSION CENTER | Facility: HOSPITAL | Age: 34
End: 2024-12-26
Attending: INTERNAL MEDICINE
Payer: COMMERCIAL

## 2024-12-26 VITALS
SYSTOLIC BLOOD PRESSURE: 118 MMHG | DIASTOLIC BLOOD PRESSURE: 66 MMHG | TEMPERATURE: 97.7 F | HEART RATE: 105 BPM | RESPIRATION RATE: 18 BRPM

## 2024-12-26 DIAGNOSIS — D50.8 OTHER IRON DEFICIENCY ANEMIA: Primary | ICD-10-CM

## 2024-12-26 PROCEDURE — 96365 THER/PROPH/DIAG IV INF INIT: CPT

## 2024-12-26 RX ORDER — SODIUM CHLORIDE 9 MG/ML
20 INJECTION, SOLUTION INTRAVENOUS ONCE
OUTPATIENT
Start: 2025-01-17

## 2024-12-26 RX ORDER — SODIUM CHLORIDE 9 MG/ML
20 INJECTION, SOLUTION INTRAVENOUS ONCE
Status: COMPLETED | OUTPATIENT
Start: 2024-12-26 | End: 2024-12-26

## 2024-12-26 RX ADMIN — IRON SUCROSE 100 MG: 20 INJECTION, SOLUTION INTRAVENOUS at 13:23

## 2024-12-26 RX ADMIN — SODIUM CHLORIDE 20 ML/HR: 0.9 INJECTION, SOLUTION INTRAVENOUS at 12:46

## 2024-12-26 NOTE — PROGRESS NOTES
Pt tolerated treatment today with no adverse reactions. Declined AVS, next apt confirmed 1/22/25 @ 1000. Left unit ambulatory with a steady gait.

## 2024-12-26 NOTE — PLAN OF CARE
Problem: Potential for Falls  Goal: Patient will remain free of falls  Description: INTERVENTIONS:  - Educate patient/family on patient safety including physical limitations  - Instruct patient to call for assistance with activity   - Consult OT/PT to assist with strengthening/mobility   - Keep Call bell within reach  - Keep bed low and locked with side rails adjusted as appropriate  - Keep care items and personal belongings within reach  - Initiate and maintain comfort rounds  - Make Fall Risk Sign visible to staff  - Apply yellow socks and bracelet for high fall risk patients  - Consider moving patient to room near nurses station  12/26/2024 1328 by Mariangel Mcnally RN  Outcome: Progressing  12/26/2024 1328 by Mariangel Mcnally, RD  Outcome: Progressing

## 2025-01-24 DIAGNOSIS — D50.8 OTHER IRON DEFICIENCY ANEMIA: Primary | ICD-10-CM

## 2025-01-24 RX ORDER — SODIUM CHLORIDE 9 MG/ML
20 INJECTION, SOLUTION INTRAVENOUS ONCE
OUTPATIENT
Start: 2025-01-25

## 2025-02-20 DIAGNOSIS — D50.8 IRON DEFICIENCY ANEMIA SECONDARY TO INADEQUATE DIETARY IRON INTAKE: Primary | ICD-10-CM

## 2025-02-20 RX ORDER — SODIUM CHLORIDE 9 MG/ML
20 INJECTION, SOLUTION INTRAVENOUS ONCE
Status: CANCELLED | OUTPATIENT
Start: 2025-02-22

## 2025-03-20 DIAGNOSIS — D50.8 OTHER IRON DEFICIENCY ANEMIA: Primary | ICD-10-CM

## 2025-03-20 RX ORDER — SODIUM CHLORIDE 9 MG/ML
20 INJECTION, SOLUTION INTRAVENOUS ONCE
OUTPATIENT
Start: 2025-03-22

## 2025-03-22 ENCOUNTER — HOSPITAL ENCOUNTER (OUTPATIENT)
Dept: INFUSION CENTER | Facility: CLINIC | Age: 35
Discharge: HOME | End: 2025-03-22

## 2025-03-22 DIAGNOSIS — D50.8 OTHER IRON DEFICIENCY ANEMIA: Primary | ICD-10-CM
